# Patient Record
Sex: MALE | Race: WHITE | Employment: FULL TIME | URBAN - METROPOLITAN AREA
[De-identification: names, ages, dates, MRNs, and addresses within clinical notes are randomized per-mention and may not be internally consistent; named-entity substitution may affect disease eponyms.]

---

## 2014-03-17 LAB — HIV AG/AB: NONREACTIVE

## 2017-10-03 ENCOUNTER — HOSPITAL ENCOUNTER (OUTPATIENT)
Dept: SURGERY | Age: 37
Discharge: HOME | End: 2017-10-03
Attending: ORTHOPAEDIC SURGERY | Admitting: ORTHOPAEDIC SURGERY

## 2017-10-03 ENCOUNTER — OFFICE VISIT (OUTPATIENT)
Dept: ORTHOPEDIC SURGERY | Age: 37
End: 2017-10-03

## 2017-10-03 VITALS
BODY MASS INDEX: 21.91 KG/M2 | HEART RATE: 76 BPM | HEIGHT: 69 IN | DIASTOLIC BLOOD PRESSURE: 76 MMHG | RESPIRATION RATE: 18 BRPM | OXYGEN SATURATION: 100 % | SYSTOLIC BLOOD PRESSURE: 124 MMHG | WEIGHT: 147.9 LBS | TEMPERATURE: 97.2 F

## 2017-10-03 VITALS
DIASTOLIC BLOOD PRESSURE: 88 MMHG | HEIGHT: 70 IN | SYSTOLIC BLOOD PRESSURE: 148 MMHG | HEART RATE: 91 BPM | RESPIRATION RATE: 15 BRPM | WEIGHT: 145 LBS | BODY MASS INDEX: 20.76 KG/M2

## 2017-10-03 DIAGNOSIS — S82.61XA CLOSED DISPLACED FRACTURE OF LATERAL MALLEOLUS OF RIGHT FIBULA, INITIAL ENCOUNTER: Primary | ICD-10-CM

## 2017-10-03 DIAGNOSIS — T14.8XXA FRACTURE: ICD-10-CM

## 2017-10-03 PROCEDURE — 27792 TREATMENT OF ANKLE FRACTURE: CPT | Performed by: ORTHOPAEDIC SURGERY

## 2017-10-03 PROCEDURE — 99203 OFFICE O/P NEW LOW 30 MIN: CPT | Performed by: ORTHOPAEDIC SURGERY

## 2017-10-03 RX ORDER — CEPHALEXIN 500 MG/1
500 CAPSULE ORAL 4 TIMES DAILY
Qty: 20 CAPSULE | Refills: 0 | Status: SHIPPED | OUTPATIENT
Start: 2017-10-03 | End: 2018-06-05

## 2017-10-03 RX ORDER — LIDOCAINE HYDROCHLORIDE 10 MG/ML
1 INJECTION, SOLUTION EPIDURAL; INFILTRATION; INTRACAUDAL; PERINEURAL
Status: COMPLETED | OUTPATIENT
Start: 2017-10-03 | End: 2017-10-03

## 2017-10-03 RX ORDER — SODIUM CHLORIDE 0.9 % (FLUSH) 0.9 %
10 SYRINGE (ML) INJECTION EVERY 12 HOURS SCHEDULED
Status: DISCONTINUED | OUTPATIENT
Start: 2017-10-03 | End: 2017-10-04 | Stop reason: HOSPADM

## 2017-10-03 RX ORDER — HYDROMORPHONE HCL 110MG/55ML
0.5 PATIENT CONTROLLED ANALGESIA SYRINGE INTRAVENOUS EVERY 5 MIN PRN
Status: DISCONTINUED | OUTPATIENT
Start: 2017-10-03 | End: 2017-10-04 | Stop reason: HOSPADM

## 2017-10-03 RX ORDER — ONDANSETRON 2 MG/ML
4 INJECTION INTRAMUSCULAR; INTRAVENOUS
Status: ACTIVE | OUTPATIENT
Start: 2017-10-03 | End: 2017-10-03

## 2017-10-03 RX ORDER — MEPERIDINE HYDROCHLORIDE 25 MG/ML
12.5 INJECTION INTRAMUSCULAR; INTRAVENOUS; SUBCUTANEOUS EVERY 5 MIN PRN
Status: DISCONTINUED | OUTPATIENT
Start: 2017-10-03 | End: 2017-10-04 | Stop reason: HOSPADM

## 2017-10-03 RX ORDER — SODIUM CHLORIDE, SODIUM LACTATE, POTASSIUM CHLORIDE, CALCIUM CHLORIDE 600; 310; 30; 20 MG/100ML; MG/100ML; MG/100ML; MG/100ML
INJECTION, SOLUTION INTRAVENOUS CONTINUOUS
Status: DISCONTINUED | OUTPATIENT
Start: 2017-10-03 | End: 2017-10-04 | Stop reason: HOSPADM

## 2017-10-03 RX ORDER — CEFAZOLIN SODIUM 2 G/100ML
2 INJECTION, SOLUTION INTRAVENOUS ONCE
Status: COMPLETED | OUTPATIENT
Start: 2017-10-03 | End: 2017-10-03

## 2017-10-03 RX ORDER — HYDRALAZINE HYDROCHLORIDE 20 MG/ML
5 INJECTION INTRAMUSCULAR; INTRAVENOUS EVERY 10 MIN PRN
Status: DISCONTINUED | OUTPATIENT
Start: 2017-10-03 | End: 2017-10-04 | Stop reason: HOSPADM

## 2017-10-03 RX ORDER — OXYCODONE HYDROCHLORIDE AND ACETAMINOPHEN 5; 325 MG/1; MG/1
1 TABLET ORAL
Status: ACTIVE | OUTPATIENT
Start: 2017-10-03 | End: 2017-10-03

## 2017-10-03 RX ORDER — LABETALOL HYDROCHLORIDE 5 MG/ML
5 INJECTION, SOLUTION INTRAVENOUS EVERY 10 MIN PRN
Status: DISCONTINUED | OUTPATIENT
Start: 2017-10-03 | End: 2017-10-04 | Stop reason: HOSPADM

## 2017-10-03 RX ORDER — SODIUM CHLORIDE 0.9 % (FLUSH) 0.9 %
10 SYRINGE (ML) INJECTION PRN
Status: DISCONTINUED | OUTPATIENT
Start: 2017-10-03 | End: 2017-10-04 | Stop reason: HOSPADM

## 2017-10-03 RX ORDER — MIDAZOLAM HYDROCHLORIDE 1 MG/ML
4 INJECTION INTRAMUSCULAR; INTRAVENOUS ONCE
Status: COMPLETED | OUTPATIENT
Start: 2017-10-03 | End: 2017-10-03

## 2017-10-03 RX ORDER — SODIUM CHLORIDE 9 MG/ML
INJECTION, SOLUTION INTRAVENOUS CONTINUOUS
Status: DISCONTINUED | OUTPATIENT
Start: 2017-10-03 | End: 2017-10-04 | Stop reason: HOSPADM

## 2017-10-03 RX ORDER — OXYCODONE HYDROCHLORIDE AND ACETAMINOPHEN 5; 325 MG/1; MG/1
1 TABLET ORAL EVERY 4 HOURS PRN
Qty: 42 TABLET | Refills: 0 | Status: SHIPPED | OUTPATIENT
Start: 2017-10-03 | End: 2018-06-05

## 2017-10-03 RX ADMIN — MIDAZOLAM HYDROCHLORIDE 4 MG: 1 INJECTION INTRAMUSCULAR; INTRAVENOUS at 11:24

## 2017-10-03 RX ADMIN — LIDOCAINE HYDROCHLORIDE 0.2 ML: 10 INJECTION, SOLUTION EPIDURAL; INFILTRATION; INTRACAUDAL; PERINEURAL at 11:14

## 2017-10-03 RX ADMIN — CEFAZOLIN SODIUM 2 G: 2 INJECTION, SOLUTION INTRAVENOUS at 11:56

## 2017-10-03 RX ADMIN — SODIUM CHLORIDE: 9 INJECTION, SOLUTION INTRAVENOUS at 11:14

## 2017-10-03 ASSESSMENT — PAIN - FUNCTIONAL ASSESSMENT: PAIN_FUNCTIONAL_ASSESSMENT: 0-10

## 2017-10-03 NOTE — IP AVS SNAPSHOT
Allergies as of 10/3/2017     No Known Allergies      Immunizations as of 10/3/2017     Name Date Dose VIS Date Route    Tdap (Boostrix, Adacel) 3/10/2012 0.5 mL 2008 Intramuscular      Last Vitals          Most Recent Value    Temp  97.2 °F (36.2 °C)    Pulse  76    Resp  18    BP  124/76         After Visit Summary    This summary was created for you. Thank you for entrusting your care to us. The following information includes details about your hospital/visit stay along with steps you should take to help with your recovery once you leave the hospital.  In this packet, you will find information about the topics listed below:    · Instructions about your medications including a list of your home medications  · A summary of your hospital visit  · Follow-up appointments once you have left the hospital  · Your care plan at home      You may receive a survey regarding the care you received during your stay. Your input is valuable to us. We encourage you to complete and return your survey in the envelope provided. We hope you will choose us in the future for your healthcare needs. Patient Information     Patient Name WILLIAM Harper 1980      Care Provided at:     Name Address Phone       [address] [phone]            Your Visit    Here you will find information about your visit, including the reason for your visit. Please take this sheet with you when you visit your doctor or other health care provider in the future. It will help determine the best possible medical care for you at that time. If you have any questions once you leave the hospital, please call the department phone number listed below. Why you were here     Your primary diagnosis was:  Not on File      Visit Information     Date & Time Provider Department Dept.  Phone    10/3/2017 Gracy Garcia MD [facility] [phone]       Follow-up Appointments Below is a list of your follow-up and future appointments. This may not be a complete list as you may have made appointments directly with providers that we are not aware of or your providers may have made some for you. Please call your providers to confirm appointments. It is important to keep your appointments. Please bring your current insurance card, photo ID, co-pay, and all medication bottles to your appointment. If self-pay, payment is expected at the time of service. Future Appointments     10/17/2017 9:15 AM     Appointment with Keshawn Grace MD at [address] [place] [address] ([phone])   [address] [place] [address] 200  [address] [place] [address]         Preventive Care        Date Due    HIV screening is recommended for all people regardless of risk factors  aged 15-65 years at least once (lifetime) who have never been HIV tested. 9/28/1997    Pneumococcal Vaccine - Pneumovax for adults aged 19-64 years with: chronic heart disease, chronic lung disease, diabetes mellitus, alcoholism, chronic liver disease, or cigarette smoking. (1 of 1 - PPSV23) 9/28/2001    Yearly Flu Vaccine (1) 9/1/2017    Tetanus Combination Vaccine (2 - Td) 3/10/2022                 Care Plan Once You Return Home    This section includes instructions you will need to follow once you leave the hospital.  Your care team will discuss these with you, so you and those caring for you know how to best care for your health needs at home. This section may also include educational information about certain health topics that may be of help to you. Discharge Instructions       ORTHOPEDIC DISCHARGE INSTRUCTIONS    · Follow your surgeons instructions. · Make follow-up appointment. · Observe operative area for signs of excessive bleeding such as a slow general ooze that saturates the dressing or bright red bleeding. In either case, apply pressure to the area and elevate if possible and call your surgeon right away. · Do not drive,operate machinery while taking narcotics. Important information for a smoker       SMOKING: QUIT SMOKING. THIS IS THE MOST IMPORTANT ACTION YOU CAN TAKE TO IMPROVE YOUR CURRENT AND FUTURE HEALTH. Call the [address] [place] [address] at [place] NOW ([phone])    Smoking harms nonsmokers. When nonsmokers are around people who smoke, they absorb nicotine, carbon monoxide, and other ingredients of tobacco smoke. DO NOT SMOKE AROUND CHILDREN     Children exposed to secondhand smoke are at an increased risk of:  Sudden Infant Death Syndrome (SIDS), acute respiratory infections, inflammation of the middle ear, and severe asthma. Over a longer time, it causes heart disease and lung cancer. There is no safe level of exposure to secondhand smoke. MyChart Signup     [employer] allows you to send messages to your doctor, view your test results, renew your prescriptions, schedule appointments, view visit notes, and more. How Do I Sign Up? 1. In your Internet browser, go to [email or web address]  2. Click on the Sign Up Now link in the Sign In box. You will see the New Member Sign Up page. 3. Enter your [employer] Access Code exactly as it appears below. You will not need to use this code after youve completed the sign-up process. If you do not sign up before the expiration date, you must request a new code. [employer] Access Code: [email or web address]  Expires: 11/30/2017 11:22 AM    4. Enter your Social Security Number ([id number]) and Date of Birth (mm/dd/yyyy) as indicated and click Submit. You will be taken to the next sign-up page. 5. Create a [email or web address] ID. This will be your [employer] login ID and cannot be changed, so think of one that is secure and easy to remember. 6. Create a [employer] password. You can change your password at any time. 7. Enter your Password Reset Question and Answer.  This can be used at a

## 2017-10-03 NOTE — H&P
[facility]   HISTORY AND PHYSICAL      I am seeing Jennifer Duarte at the request of Dr Reggie Rueda for evaluation of the patient's medical problems prior to open reduction internal fixation right lateral malleolus fracture     10/3/2017 8:10 AM    Patient Information:  Jennifer Daurte is a 30 y.o. male [id number]  PCP:  No primary care provider on file. (Tel: None )    Chief complaint:  I need my ankle fixed     History of Present Illness:  Gabrielle Hathaway is a 30 y.o. male who presented with a right ankle fracture. Symptom onset was acute for a time period of the past 2 - 3 days. The patient reports that he rolled his ankle while celebrating his birthday in his backyard. The severity is described as moderate to severe. The course of his symptoms over time is acute. The symptoms improved with pain medications and worsened with movement. The patient's symptom is associated with an ankle fracture. History obtained from patient and record review   No problems with previous anesthesia    REVIEW OF SYSTEMS:   Constitutional:  Negative for fever,chills or night sweats  ENT:  Negative for rhinorrhea, epistaxis, hoarseness, sore throat. Respiratory:   Negative for shortness of breath,wheezing  Cardiovascular:   Negative for  chest pain, palpitations   Gastrointestinal:  Negative for nausea, vomiting, diarrhea  Genitourinary:  Negative for polyuria, dysuria   Hematologic/Lymphatic:  Negative for  bleeding tendency, easy bruising  Musculoskeletal:  Negative for myalgias and arthralgias  Neurologic:  Negative for  confusion,dysarthria. Skin:  Negative for itching,rash  Psychiatric:  Negative for depression,anxiety, agitation. Endocrine:  Negative for polydipsia,polyuria,heat /cold intolerance. Past Medical History:   has no past medical history on file.      Past Surgical History:   has a past surgical history that includes above.    Problem List  Right ankle fracture    Tobacco abuse    Assessment & Recommendation:     1. Tobacco abuse:  Cessation encouraged  2. Right ankle fracture: For surgical repair today      Patient is  medically optimized for surgery. Thank you for the opportunity to participate in the care of your patient.   Love Snowden CNP    10/3/2017 8:10 AM

## 2017-10-03 NOTE — PROCEDURES
Ultrasound-guided Sciatic Nerve Block, Single Shot, Procedure Note    Kostas Porfirio    Procedure: Right Popliteal Sciatic Nerve Block, Single Shot, Ultrasound-guided  Indications: Postoperative pain control and as requested by surgeon  Consent: Risks/Benefits including infection and nerve injury discussed and         patient wishes to proceed  Timeout completed with RN  Patient Position: Prone  Sedation: Versed 4 mg         Fentanyl 0 ml  Skin Prep: Chlorhexadine  Procedure Note:  Patient identified, consented, site marked, time out performed. Monitors in place. Patient positioned prone. Right popliteal fossa prepped. Sciatic nerve above level of split identified on ultrasound along with popliteal vessels. 1% lidocaine used to anesthetize the skin and subcutaneous tissues. 4 in. 21 ga. short bevel Stimuplex needle inserted under ultrasound guidance from lateral aspect in plane with tip in view throughout to the sciatic nerve. In divided doses and with negative aspiration throughout, 20 cc of 0.5% bupivacaine plain was slowly injected around the nerve. No parasthesias noted and good local spread seen on ultrasound. Patient tolerated the procedure well without any apparent or immediate complications. Ultrasound images placed in patient's chart.     Diana Gagnon  11:41 AM

## 2017-10-03 NOTE — PROGRESS NOTES
Right lower extremity nerve block complete, patient tolerated procedure well. O2 sat remains 100% on O2 at 2 liters per nasal canula. Continuous pulse ox remains in place.

## 2017-10-03 NOTE — PROGRESS NOTES
CHIEF COMPLAINT: Right ankle pain / lateral malleolus fracture. DATE OF INJURY: 10/1/2017    HISTORY:  Mr. Twila Mora 30 y.o.  male presents today for the first visit for evaluation of a right ankle injury which occurred when he was lifting someone up and twisted his right ankle when he was celebrating his birthday. He was first seen and evaluated in  , where he was x-rayed, splinted and asked to f/u with me. He is complaining of medial & lateral ankle pain and swelling. This is better with elevation and worse with bearing any wt. The pain is sharp and not radiating. No other complaint. No past medical history on file. Past Surgical History:   Procedure Laterality Date    BACK SURGERY      SHOULDER SURGERY Right        Social History     Social History    Marital status:      Spouse name: N/A    Number of children: N/A    Years of education: N/A     Occupational History    Not on file. Social History Main Topics    Smoking status: Current Every Day Smoker     Packs/day: 1.00     Types: Cigarettes    Smokeless tobacco: Not on file    Alcohol use Yes      Comment: occassionally    Drug use: Yes     Special: Marijuana      Comment: \"once in a while\"    Sexual activity: Yes     Other Topics Concern    Not on file     Social History Narrative       No family history on file. Current Outpatient Prescriptions on File Prior to Visit   Medication Sig Dispense Refill    HYDROcodone-acetaminophen (NORCO) 5-325 MG per tablet Take 1 tablet by mouth every 6 hours as needed for Pain . 10 tablet 0    ibuprofen (ADVIL;MOTRIN) 800 MG tablet Take 1 tablet by mouth every 8 hours as needed for Pain or Fever 20 tablet 0     No current facility-administered medications on file prior to visit. Pertinent items are noted in HPI  Review of systems reviewed from Patient History Form dated on 10/3/2017 and available in the patient's chart under the Media tab.         PHYSICAL

## 2017-10-03 NOTE — PROGRESS NOTES
Patient safety time out performed for right lower extremity nerve block, 4 mg versed administered by Dr Freeman Man for procedure, O2 sat 100% on room air, O2 on at 2 liters per nasal canula. Continuous pulse ox in place.

## 2017-10-03 NOTE — PROGRESS NOTES
Patient arrived from OR to PACU. Oxygen saturation 100% on 2L n/c. NSR on the monitor. VSS. drsg to right ankle CDI. Skin warm, brisk cap refill, + 2 pedal pulse.  Will continue to monitor    Electronically signed by Qasim Teague RN on 10/3/2017 at [phone]

## 2017-10-03 NOTE — PROGRESS NOTES
Discharge instructions went over with patient and patient's girlfriend. Patient has prescriptions. Patient has own crutches. Aware to take only one narcotic to help with pain control patient has a prescription for norco which was given prior to procedure and prescription for percocet for procedure), patient verbalizes understanding. All personal belongings with patient.  Girlfriend taking patient home in stable condition    Electronically signed by Qasim Teague RN on 10/3/2017 at 1440

## 2017-10-03 NOTE — BRIEF OP NOTE
Brief Postoperative Note    Leeanne Marshall  YOB: 1980  [id number]    Pre-operative Diagnosis: Right ankle lateral malleolus fracture.     Post-operative Diagnosis: Same    Procedure: ORIF right ankle lateral malleolus fracture    Anesthesia: General    Surgeons/Assistants: Izabel/Nimo    Estimated Blood Loss: less than 50     Complications: None    Specimens: Was Not Obtained    Findings: Same    Electronically signed by Tawanna Coon MD on 10/3/2017 at 2:16 PM

## 2017-10-03 NOTE — ANESTHESIA PRE-OP
capsule Take 1 capsule by mouth 4 times daily 20 capsule 0     Current Facility-Administered Medications   Medication Dose Route Frequency Provider Last Rate Last Dose    ceFAZolin (ANCEF) 2 g in dextrose 4 % 100 mL IVPB (premix)  2 g Intravenous Once Rula Joseph MD        HYDROmorphone (DILAUDID) injection 0.5 mg  0.5 mg Intravenous Q5 Min PRN Lul Reynoso MD        oxyCODONE-acetaminophen (PERCOCET) 5-325 MG per tablet 1 tablet  1 tablet Oral Once PRN Lul Reynoso MD        ondansetron [facility]) injection 4 mg  4 mg Intravenous Once PRN Lul Reynoso MD        labetalol (NORMODYNE;TRANDATE) injection 5 mg  5 mg Intravenous Q10 Min PRN Lul Reynoso MD        hydrALAZINE (APRESOLINE) injection 5 mg  5 mg Intravenous Q10 Min PRN Lul Reynoso MD        meperidine (DEMEROL) injection 12.5 mg  12.5 mg Intravenous Q5 Min PRN Lul Reynoso MD        midazolam (VERSED) injection 4 mg  4 mg Intravenous Once Lul Reynoso MD           Vital Signs  (Current)   Vitals:    10/03/17 1051   BP: 127/86   Pulse: 83   Resp: 14   Temp: 98.7 °F (37.1 °C)   SpO2: 100%     (for past 48 hrs)  BP  Min: 127/86   Min taken time: 10/03/17 1051  Max: 148/88   Max taken time: 10/03/17 100  Temp  Av.7 °F (37.1 °C)  Min: 98.7 °F (37.1 °C)   Min taken time: 10/03/17 105  Max: 98.7 °F (37.1 °C)   Max taken time: 10/03/17 105  Pulse  Av.3  Min: 83   Min taken time: 10/03/17 105  Max: 91   Max taken time: 10/03/17 1001  Resp  Av.5  Min: 15   Min taken time: 10/03/17 105  Max: 15   Max taken time: 10/03/17 0941  SpO2  Av %  Min: 100 %   Min taken time: 10/03/17 105  Max: 100 %   Max taken time: 10/03/17 105  (last three values)   BP Readings from Last 3 Encounters:   10/03/17 (!) 148/88   10/03/17 127/86   10/01/17 134/86       CBC  No results found for: WBC, RBC, HGB, HCT, MCV, RDW, PLT    CMP  No results found for: NA, K, CL, CO2, BUN, CREATININE, GFRAA, AGRATIO, LABGLOM, GLUCOSE, PROT, CALCIUM, BILITOT, ALKPHOS, AST, ALT    BMP  No results found for: NA, K, CL, CO2, BUN, CREATININE, CALCIUM, GFRAA, LABGLOM, GLUCOSE    Coags   No results found for: PROTIME, INR, APTT    HCG (If Applicable) No results found for: PREGTESTUR, PREGSERUM, HCG, HCGQUANT     ABGs  No results found for: PHART, PO2ART, MJU1JOF, BLX3NRM, BEART, F3FNKXNN     Type & Screen (If Applicable)  No results found for: LABABO, LABRH      POCGlucose  No results for input(s): GLUCOSE in the last 72 hours. NPO Status  > 8 hours   Date of last liquid consumption: 10/03/17   Time of last liquid consumption: 0900   Date of last solid food consumption: 10/02/17      Time of last solid consumption: 2030    BMI  Body mass index is 21.84 kg/(m^2). Estimated body mass index is 21.84 kg/(m^2) as calculated from the following:    Height as of this encounter: 5' 9\" (1.753 m). Weight as of this encounter: 147 lb 14.4 oz (67.1 kg). Additional Testing (Echo, Stress, ECG, PFTs, etc)        Anesthesia Evaluation  Patient summary reviewed and Nursing notes reviewed no history of anesthetic complications:   Airway: Mallampati: II  TM distance: >3 FB   Neck ROM: full  Mouth opening: > = 3 FB Dental:    (+) lower dentures and upper dentures      Pulmonary:normal exam  breath sounds clear to auscultation      (-) asthma    ROS comment: Current smoker   Cardiovascular:negative ROS  Exercise tolerance: good (>4 METS),           Rhythm: regular  Rate: normal                 Neuro/Psych:   {neg ROS     GI/Hepatic/Renal: neg ROS       (-) GERD     Endo/Other: negative ROS         Abdominal:                    Anesthesia Plan    ASA 2     general   (Consented for sciatic block, risks/benefits discussed including nerve damage, bleeding, infection, and local anesthetic toxicity, patient understanding and wishes to proceed)  intravenous induction   Anesthetic plan and risks discussed with patient.     Plan discussed

## 2017-10-04 NOTE — OP NOTE
[facility] [place] [facility]                    [facility], [address] [place] [address]                               OPERATIVE REPORT    PATIENT NAME: Sumit Mejia                   :             1980  MED REC NO:   [id number]                           ROOM:  ACCOUNT NO:   [de-identified]                           ADMISSION DATE:  10/03/2017  PROVIDER:     Avani Herring MD      DATE OF PROCEDURE:  10/03/2017    PREOPERATIVE DIAGNOSIS:  Right ankle lateral malleolus displaced  fracture. POSTOPERATIVE DIAGNOSIS:  Right ankle lateral malleolus displaced  fracture. OPERATION PERFORMED:  1. Open treatment of right ankle lateral malleolus fracture. 2.  Internal fixation. SURGEON:  MD Esteban Luciano, surgical assistant. ANESTHESIA:  General anesthesia. ESTIMATED BLOOD LOSS:  Minimal.    COMPLICATIONS:  None. TOURNIQUET:  Right upper calf 250 mmHg. IMPLANTS USED:  Zhang distal fibular locking plate and 1 lag screw. INDICATIONS:  This is a 53-year-old white male who sustained a  twisting injury to his right ankle with a displaced lateral malleolus  fracture. All the risks, benefits and alternatives were discussed  with the patient. He elected to proceed with surgical  treatment. DESCRIPTION OF THE PROCEDURE:  The patient's right ankle was marked. He received 2 g Ancef IV preoperatively. The patient was then brought  to the operating room, underwent general anesthesia. A well-padded  tourniquet was placed to the right upper calf. The right lower  extremity was then prepped and draped in regular sterile routine  fashion. A time-out was called to confirm the patient's name, site,  and procedure. Esmarch was used for exsanguination. Tourniquet was inflated to 250  mmHg. A lateral incision was made over the distal fibula. The  fracture was exposed and was found to be mildly displaced with  shortening and mild irritation.   We were able to clean up the fracture  fragments and reduce it anatomically with two bone clamps. While  maintaining the reduction we placed one lag screw from the anterior to  posterior. At this point we put the plate in its appropriate  position. After we confirmed that the plate was in good position and  the fracture anatomically reduced, We put one screw in the oblong hole  and then we locked it in place with total of five distal 2.7 locking  screws. We added one more cortical screw in the shaft. Total three  screws proximally and five screws distally. We are very satisfied  with the anatomic reduction and stabilization of the fracture. At  this point, we let the tourniquet down and hemostasis was secured. We  irrigated the incision copiously with normal saline. We then closed  the deep layer with a 2-0 Vicryl, subcutaneous with a 3-0 Vicryl, and  the skin with a 4-0 Monocryl. Steri-Strips were then applied. A  dressing was then applied in the form of Xeroform, 4x4s, sterile  Webril, and a posterior splint was applied. The patient tolerated the procedure well and was taken to the recovery  in stable condition. POSTOPERATIVE PLAN:  The patient will be discharged homer. He will be  nonweightbearing for at least six weeks. We can start range of motion  in two weeks.         Radha Nunez MD    D: 10/03/2017 16:19:25       T: 10/03/2017 22:43:49     [email or web address]  Job#: [id number]     Doc#: [id number]

## 2017-10-04 NOTE — ANESTHESIA POST-OP
Anesthesia Post-op Note    Patient: Eric Ross  MRN: [id number]  YOB: 1980  Date of evaluation: 10/4/2017  Time:  6:35 AM     Procedure(s) Performed:     Last Vitals: /76  Pulse 76  Temp 97.2 °F (36.2 °C) (Temporal)   Resp 18  Ht 5' 9\" (1.753 m)  Wt 147 lb 14.4 oz (67.1 kg)  SpO2 100%  BMI 21.84 kg/m2    Mercy Phase I: Mercy Score: 10    Mercy Phase II: Mercy Score: 10    Anesthesia Post Evaluation    Final anesthesia type: general  Patient location during evaluation: PACU  Patient participation: complete - patient participated  Level of consciousness: awake and alert  Airway patency: patent  Nausea & Vomiting: no nausea and no vomiting  Complications: no  Cardiovascular status: hemodynamically stable  Respiratory status: acceptable  Hydration status: stable        Fatuma Fry MD  6:35 AM

## 2017-10-11 RX ORDER — OXYCODONE HYDROCHLORIDE AND ACETAMINOPHEN 5; 325 MG/1; MG/1
1 TABLET ORAL EVERY 4 HOURS PRN
Qty: 42 TABLET | Refills: 0 | Status: SHIPPED | OUTPATIENT
Start: 2017-10-11 | End: 2018-06-05

## 2017-10-11 NOTE — TELEPHONE ENCOUNTER
Pt requesting a refill of Oxycodone and antibiotic  pls call pt when sent in to his pharmacy   pls call pt thanks

## 2017-10-17 ENCOUNTER — OFFICE VISIT (OUTPATIENT)
Dept: ORTHOPEDIC SURGERY | Age: 37
End: 2017-10-17

## 2017-10-17 VITALS — HEIGHT: 69 IN | WEIGHT: 147 LBS | RESPIRATION RATE: 15 BRPM | BODY MASS INDEX: 21.77 KG/M2

## 2017-10-17 DIAGNOSIS — S82.61XA CLOSED DISPLACED FRACTURE OF LATERAL MALLEOLUS OF RIGHT FIBULA, INITIAL ENCOUNTER: Primary | ICD-10-CM

## 2017-10-17 PROCEDURE — 99024 POSTOP FOLLOW-UP VISIT: CPT | Performed by: NURSE PRACTITIONER

## 2017-10-17 PROCEDURE — L4361 PNEUMA/VAC WALK BOOT PRE OTS: HCPCS | Performed by: NURSE PRACTITIONER

## 2017-10-17 PROCEDURE — 73610 X-RAY EXAM OF ANKLE: CPT | Performed by: NURSE PRACTITIONER

## 2017-10-17 NOTE — PROGRESS NOTES
DIAGNOSIS:  Right ankle lateral malleolus displaced fracture, status post ORIF. DATE OF SURGERY:  10/3/2017. HISTORY OF PRESENT ILLNESS:  Mr. Mini Chi 30 y.o.  male who came in today for 2 weeks postoperative visit. The patient denies any significant pain in the right ankle. Rates pain a 2/10 VAS mild, aching, intermittent and are improving. Pain is worse with movement and activity and better with rest. Alleviating factors ice, elevation and rest. He has been in a splint, and non WB. No numbness or tingling sensation. No fever or Chills. PHYSICAL EXAMINATION:  The incision healing well. No signs of any erythema or drainage, minimal swelling. He has no pain with the active or passive range of motion of the right ankle, but decrease ROM. He has intact sensation distally, and he is neurovascularly intact. IMAGING:  Three views right ankle showed anatomic alignment of the fracture, plate and screws in good position, no loosening. Ankle mortise is well centered. IMPRESSION:  2 weeks out from right ankle lateral malleolus displaced fracture, ORIF and doing very well. PLAN: He placed in a boot, and non WB for 6 weeks. I have told the patient to work on ROM, The patient will come back for a follow up in 6 weeks. At that time, we will take 3 views of the right ankle standing. The patient smokes, and we discussed with the patient the risks of smoking on general health and also on bone and soft tissue healing (delay and non-union), and promised to cut down or stop smoking. Procedures    Breg Tall Genisus Walking Boot     Patient was prescribed a Breg Tall Genisus Walking Boot. The right ankle will require stabilization / immobilization from this semi-rigid / rigid orthosis to improve their function. The orthosis will assist in protecting the affected area, provide functional support and facilitate healing.     The patient was educated and fit by a healthcare professional with expert knowledge and specialization in brace application while under the direct supervision of the physician. Verbal and written instructions for the use of and application of this item were provided. They were instructed to contact the office immediately should the brace result in increased pain, decreased sensation, increased swelling or worsening of the condition.        Lin January, CNP

## 2017-10-23 DIAGNOSIS — S82.61XA CLOSED DISPLACED FRACTURE OF LATERAL MALLEOLUS OF RIGHT FIBULA, INITIAL ENCOUNTER: Primary | ICD-10-CM

## 2017-10-23 RX ORDER — OXYCODONE HYDROCHLORIDE AND ACETAMINOPHEN 5; 325 MG/1; MG/1
1 TABLET ORAL EVERY 4 HOURS PRN
Qty: 42 TABLET | Refills: 0 | Status: SHIPPED | OUTPATIENT
Start: 2017-10-23 | End: 2017-11-22

## 2017-10-23 NOTE — TELEPHONE ENCOUNTER
Patient requesting a refill of Ibuprofen 800 mg, last filled 10/1/17 and Percocet 5/325 mg, last filled 10/11/17    Preferred pharmacy -[facility] [phone]    Patient can be reached at [phone]

## 2017-11-06 DIAGNOSIS — S82.61XA CLOSED DISPLACED FRACTURE OF LATERAL MALLEOLUS OF RIGHT FIBULA, INITIAL ENCOUNTER: Primary | ICD-10-CM

## 2017-11-06 RX ORDER — OXYCODONE HYDROCHLORIDE AND ACETAMINOPHEN 5; 325 MG/1; MG/1
1 TABLET ORAL EVERY 6 HOURS PRN
Qty: 30 TABLET | Refills: 0 | Status: SHIPPED | OUTPATIENT
Start: 2017-11-06 | End: 2017-12-06

## 2017-11-14 RX ORDER — HYDROCODONE BITARTRATE AND ACETAMINOPHEN 5; 325 MG/1; MG/1
1 TABLET ORAL EVERY 6 HOURS PRN
Qty: 28 TABLET | Refills: 0 | Status: SHIPPED | OUTPATIENT
Start: 2017-11-14 | End: 2017-11-21

## 2017-11-14 NOTE — TELEPHONE ENCOUNTER
Pt called to get a refill on his pain medicine  Last filled 11-6-17  Amount 30   [facility] [phone]

## 2017-11-21 RX ORDER — HYDROCODONE BITARTRATE AND ACETAMINOPHEN 5; 325 MG/1; MG/1
1 TABLET ORAL EVERY 8 HOURS PRN
Qty: 21 TABLET | Refills: 0 | Status: SHIPPED | OUTPATIENT
Start: 2017-11-21 | End: 2017-11-28

## 2017-11-28 ENCOUNTER — OFFICE VISIT (OUTPATIENT)
Dept: ORTHOPEDIC SURGERY | Age: 37
End: 2017-11-28

## 2017-11-28 VITALS — HEIGHT: 69 IN | WEIGHT: 147 LBS | RESPIRATION RATE: 16 BRPM | HEART RATE: 80 BPM | BODY MASS INDEX: 21.77 KG/M2

## 2017-11-28 DIAGNOSIS — S82.61XA CLOSED DISPLACED FRACTURE OF LATERAL MALLEOLUS OF RIGHT FIBULA, INITIAL ENCOUNTER: Primary | ICD-10-CM

## 2017-11-28 PROCEDURE — 73610 X-RAY EXAM OF ANKLE: CPT | Performed by: NURSE PRACTITIONER

## 2017-11-28 PROCEDURE — 99024 POSTOP FOLLOW-UP VISIT: CPT | Performed by: NURSE PRACTITIONER

## 2017-11-28 RX ORDER — HYDROCODONE BITARTRATE AND ACETAMINOPHEN 5; 325 MG/1; MG/1
1 TABLET ORAL 2 TIMES DAILY PRN
Qty: 14 TABLET | Refills: 0 | Status: SHIPPED | OUTPATIENT
Start: 2017-11-28 | End: 2018-06-05

## 2019-05-28 ENCOUNTER — HOSPITAL ENCOUNTER (EMERGENCY)
Age: 39
Discharge: HOME | End: 2019-05-28

## 2019-05-28 VITALS
HEART RATE: 99 BPM | OXYGEN SATURATION: 100 % | HEIGHT: 70 IN | BODY MASS INDEX: 23.62 KG/M2 | DIASTOLIC BLOOD PRESSURE: 89 MMHG | WEIGHT: 165 LBS | RESPIRATION RATE: 14 BRPM | SYSTOLIC BLOOD PRESSURE: 139 MMHG | TEMPERATURE: 98 F

## 2019-05-28 DIAGNOSIS — Z51.89 VISIT FOR WOUND CHECK: Primary | ICD-10-CM

## 2019-05-28 PROCEDURE — 99282 EMERGENCY DEPT VISIT SF MDM: CPT

## 2019-05-28 ASSESSMENT — ENCOUNTER SYMPTOMS
VOMITING: 0
COLOR CHANGE: 0
NAUSEA: 0
ABDOMINAL PAIN: 0
SHORTNESS OF BREATH: 0

## 2019-05-28 ASSESSMENT — PAIN SCALES - GENERAL: PAINLEVEL_OUTOF10: 3

## 2019-05-28 ASSESSMENT — PAIN DESCRIPTION - LOCATION: LOCATION: HEAD

## 2019-05-28 NOTE — ED NOTES
Patient discharged to home in stable condition with family via private car. Discharge instructions reviewed with patient and family members. Patient and family verbalized understanding. All belongings in tow including discharge paperwork.                    Keysha Shields RN  05/28/19 6779

## 2019-05-28 NOTE — ED PROVIDER NOTES
Laterality Date    ANKLE SURGERY Right 10/03/2017    BACK SURGERY      SHOULDER SURGERY Right          CURRENTMEDICATIONS       Previous Medications    No medications on file         ALLERGIES     Patient has no known allergies. FAMILYHISTORY     History reviewed. No pertinent family history.        SOCIAL HISTORY       Social History     Socioeconomic History    Marital status:      Spouse name: None    Number of children: None    Years of education: None    Highest education level: None   Occupational History    None   Social Needs    Financial resource strain: None    Food insecurity:     Worry: None     Inability: None    Transportation needs:     Medical: None     Non-medical: None   Tobacco Use    Smoking status: Current Every Day Smoker     Packs/day: 1.00     Types: Cigarettes    Smokeless tobacco: Never Used   Substance and Sexual Activity    Alcohol use: Yes     Comment: occassionally    Drug use: Yes     Types: Marijuana     Comment: last used Saturday    Sexual activity: Yes   Lifestyle    Physical activity:     Days per week: None     Minutes per session: None    Stress: None   Relationships    Social connections:     Talks on phone: None     Gets together: None     Attends Yazidism service: None     Active member of club or organization: None     Attends meetings of clubs or organizations: None     Relationship status: None    Intimate partner violence:     Fear of current or ex partner: None     Emotionally abused: None     Physically abused: None     Forced sexual activity: None   Other Topics Concern    None   Social History Narrative    None       SCREENINGS             PHYSICAL EXAM    (up to 7 for level 4, 8 or more for level 5)     ED Triage Vitals   BP Temp Temp Source Pulse Resp SpO2 Height Weight   05/28/19 1502 05/28/19 1500 05/28/19 1500 05/28/19 1500 05/28/19 1502 05/28/19 1500 05/28/19 1500 05/28/19 1500   139/89 98 °F (36.7 °C) Infrared 99 14 100 % 5' 10\" (1.778 m) 165 lb (74.8 kg)       Physical Exam   Constitutional: He is oriented to person, place, and time. He appears well-developed and well-nourished. No distress. HENT:   Head: Normocephalic and atraumatic. Nose: Nose normal.   Mouth/Throat: Oropharynx is clear and moist.   Patient has a 1.5 cm well healing laceration to the right crown scalp. Patient has a firm skin in place. Eyes: Conjunctivae and EOM are normal. Right eye exhibits no discharge. Left eye exhibits no discharge. Neck: Normal range of motion. Neck supple. Cardiovascular: Normal rate, regular rhythm and normal heart sounds. Exam reveals no gallop. No murmur heard. Pulmonary/Chest: Effort normal and breath sounds normal. No respiratory distress. He has no wheezes. He has no rales. Abdominal: Soft. There is no tenderness. Musculoskeletal: Normal range of motion. He exhibits no edema, tenderness or deformity. Neurological: He is alert and oriented to person, place, and time. Skin: Skin is warm and dry. Capillary refill takes less than 2 seconds. No rash noted. He is not diaphoretic. No erythema. No pallor. Psychiatric: He has a normal mood and affect. His behavior is normal.   Nursing note and vitals reviewed. DIAGNOSTIC RESULTS   LABS:    Labs Reviewed - No data to display    All other labs were within normal range or not returned as of this dictation. EKG: All EKG's are interpreted by the Emergency Department Physician who either signs orCo-signs this chart in the absence of a cardiologist.  Please see their note for interpretation of EKG. RADIOLOGY:   Non-plain film images such as CT, Ultrasound and MRI are read by the radiologist. Plain radiographic images are visualized andpreliminarily interpreted by the  ED Provider with the below findings:        Interpretation perthe Radiologist below, if available at the time of this note:    No orders to display     No results found.        PROCEDURES   Unless otherwise noted below, none     Procedures    CRITICAL CARE TIME   N/A    CONSULTS:  None      EMERGENCY DEPARTMENT COURSE and DIFFERENTIALDIAGNOSIS/MDM:   Vitals:    Vitals:    05/28/19 1500 05/28/19 1502   BP:  139/89   Pulse: 99    Resp:  14   Temp: 98 °F (36.7 °C)    TempSrc: Infrared    SpO2: 100%    Weight: 165 lb (74.8 kg)    Height: 5' 10\" (1.778 m)        Patient was given thefollowing medications:  Medications - No data to display    Patient presents emergency department for evaluation of scalp wound. Patient lacerated his scalp with a dog leash last night. Patient is up-to-date on tetanus. Patient has 1.5 centimeter well-healing laceration to the crown scalp. There is a firm scalp in place. At this time wound has healed and repair will not be performed at this time. Patient is amenable to this and will be discharged home. This time the patient is at low risk for infection, bleeding, other complication    FINAL IMPRESSION      1.  Visit for wound check          DISPOSITION/PLAN   DISPOSITION Decision To Discharge 05/28/2019 03:32:59 PM      PATIENT REFERREDTO:  [facility] Emergency Department  [address] [facility]  [phone]    If symptoms worsen      DISCHARGE MEDICATIONS:  New Prescriptions    No medications on file       DISCONTINUED MEDICATIONS:  Discontinued Medications    NAPROXEN (NAPROSYN) 500 MG TABLET    Take 1 tablet by mouth 2 times daily as needed for Pain              (Please note that portions ofthis note were completed with a voice recognition program.  Efforts were made to edit the dictations but occasionally words are mis-transcribed.)    Latisha Do PA-C (electronically signed)            Latisha Do PA-C  05/28/19 2605

## 2019-06-09 ENCOUNTER — HOSPITAL ENCOUNTER (EMERGENCY)
Age: 39
Discharge: HOME | End: 2019-06-09

## 2019-06-09 VITALS
RESPIRATION RATE: 18 BRPM | DIASTOLIC BLOOD PRESSURE: 82 MMHG | WEIGHT: 156.53 LBS | SYSTOLIC BLOOD PRESSURE: 129 MMHG | TEMPERATURE: 97.5 F | HEIGHT: 70 IN | OXYGEN SATURATION: 100 % | HEART RATE: 87 BPM | BODY MASS INDEX: 22.41 KG/M2

## 2019-06-09 DIAGNOSIS — S41.112A LACERATION OF MULTIPLE SITES OF LEFT UPPER EXTREMITY, INITIAL ENCOUNTER: Primary | ICD-10-CM

## 2019-06-09 PROCEDURE — 99282 EMERGENCY DEPT VISIT SF MDM: CPT

## 2019-06-09 PROCEDURE — 4500000022 HC ED LEVEL 2 PROCEDURE

## 2019-06-09 RX ORDER — CEPHALEXIN 500 MG/1
500 CAPSULE ORAL 4 TIMES DAILY
Qty: 40 CAPSULE | Refills: 0 | Status: SHIPPED | OUTPATIENT
Start: 2019-06-09 | End: 2019-06-19

## 2019-06-09 ASSESSMENT — PAIN DESCRIPTION - LOCATION: LOCATION: ELBOW

## 2019-06-09 ASSESSMENT — PAIN DESCRIPTION - PROGRESSION: CLINICAL_PROGRESSION: NOT CHANGED

## 2019-06-09 ASSESSMENT — PAIN DESCRIPTION - ORIENTATION: ORIENTATION: LEFT

## 2019-06-09 ASSESSMENT — PAIN DESCRIPTION - DESCRIPTORS: DESCRIPTORS: BURNING

## 2019-06-09 ASSESSMENT — PAIN SCALES - GENERAL: PAINLEVEL_OUTOF10: 1

## 2019-06-09 ASSESSMENT — PAIN - FUNCTIONAL ASSESSMENT: PAIN_FUNCTIONAL_ASSESSMENT: ACTIVITIES ARE NOT PREVENTED

## 2019-06-09 ASSESSMENT — PAIN DESCRIPTION - ONSET: ONSET: ON-GOING

## 2019-06-09 ASSESSMENT — PAIN DESCRIPTION - FREQUENCY: FREQUENCY: CONTINUOUS

## 2019-06-09 ASSESSMENT — PAIN DESCRIPTION - PAIN TYPE: TYPE: ACUTE PAIN

## 2019-06-09 NOTE — ED NOTES
Pt ambulates around er to BR and for water without complications.      Karlene Wisdom RN  06/09/19 0284

## 2019-06-09 NOTE — ED NOTES
Pt ambulating around unit stating \"just put a bandaid on it I'm fine. I'm gonna sign out against all medical advice. \" The PA then comes around corner to repair wounds.      Brittni Urena RN  06/09/19 8384

## 2019-06-09 NOTE — ED PROVIDER NOTES
oriented to person, place, and time. Vital signs are normal. He appears well-developed and well-nourished. Non-toxic appearance. No distress. HENT:   Head: Normocephalic and atraumatic. Eyes: Conjunctivae are normal. No scleral icterus. Neck: Full passive range of motion without pain. Neck supple. No JVD present. No spinous process tenderness and no muscular tenderness present. Cardiovascular: Normal rate and regular rhythm. Exam reveals no gallop and no friction rub. No murmur heard. Pulmonary/Chest: Effort normal and breath sounds normal. No respiratory distress. Musculoskeletal: Normal range of motion. Neurological: He is alert and oriented to person, place, and time. He has normal strength. No cranial nerve deficit or sensory deficit. Reflex Scores:       Brachioradialis reflexes are 2+ on the right side and 2+ on the left side. Skin: Skin is warm and dry. Capillary refill takes less than 2 seconds. Laceration noted. Patient has 3 lacerations on the left arm totaling 7 cm. He also has bruising and several other small abrasions/lacerations. Distal extremity is pink and well perfused. Left radial pulse 2+. Ulnar, medial, and radial nerves intact. Muscles are all soft with 5/5 strength. All 3 lacerations were anesthetized and then copiously irrigated vigorously scrubbed with chlorhexidine. Patient refused x-ray. I had to talk him into getting sutures. There was no bone tendon or foreign body in the wounds. A total of 11 sutures were placed with decent approximation of wound edges. Patient remains neurovascularly intact. Patient will be started on Keflex due to the extent and number of injuries. Psychiatric: He has a normal mood and affect. Nursing note and vitals reviewed.                       MEDICAL DECISION MAKING    Vitals:    Vitals:    06/09/19 0103   BP: 129/82   Pulse: 87   Resp: 18   Temp: 97.5 °F (36.4 °C)   TempSrc: Oral   SpO2: 100%   Weight: 156 lb 8.4 oz (71 kg) Height: 5' 10\" (1.778 m)       LABS:Labs Reviewed - No data to display     Remainder of labs reviewed and werenegative at this time or not returned at the time of this note. RADIOLOGY:   Non-plain film images such as CT, Ultrasound and MRI are read by the radiologist. HOMER Galvan CNP have directly visualized the radiologic plain film image(s) with the below findings:        Interpretation per the Radiologist below, if available at the time of thisnote:    No orders to display        No results found. MEDICAL DECISION MAKING / ED COURSE:      PROCEDURES:   Lac Repair  Date/Time: 6/9/2019 4:14 AM  Performed by: HOMER Hedrick CNP  Authorized by: HOMER Hedrick CNP     Consent:     Consent obtained:  Verbal    Consent given by:  Patient    Risks discussed:  Infection, poor cosmetic result, nerve damage, tendon damage, retained foreign body and vascular damage  Anesthesia (see MAR for exact dosages): Anesthesia method:  Local infiltration    Local anesthetic:  Bupivacaine 0.5% WITH epi  Laceration details:     Location:  Shoulder/arm    Shoulder/arm location:  L lower arm    Length (cm):  7  Repair type:     Repair type:  Simple  Pre-procedure details:     Preparation:  Patient was prepped and draped in usual sterile fashion and imaging obtained to evaluate for foreign bodies  Exploration:     Hemostasis achieved with:  Direct pressure and epinephrine    Wound exploration: wound explored through full range of motion and entire depth of wound probed and visualized      Contaminated: yes    Treatment:     Wound cleansed with: Chlorhexidine.     Amount of cleaning:  Extensive  Skin repair:     Repair method:  Sutures    Suture size:  5-0    Suture material:  Nylon    Suture technique:  Simple interrupted    Number of sutures:  11  Approximation:     Approximation:  Close    Vermilion border: well-aligned    Post-procedure details:     Dressing:  Sterile dressing    Patient tolerance of procedure: Tolerated well, no immediate complications        Patient was given:  Medications - No data to display    Differential diagnosis: Tendon laceration, neurologic injury, vascular injury, involvement of bone that could lead to osteomyelitis, retained foreign body, delayed bacterial skin infection, other    Patient seen and examined today for laceration. See HPI for patient presentation. Patient is hemodynamically stable, nontoxic, afebrile, and without tachycardia, tachypnea, and hypoxia. Physical exam as above. Well-appearing 68-year-old male lying in bed in no acute distress. Multiple lacerations. No bone tendon or foreign body. Neurovascularly intact without deficits. Wounds copiously irrigated vigorously scrubbed. 11 sutures placed. Keflex prescribed. Patient advised to have sutures out in 7 days and return immediately for any signs of infection. At this time, the evidence for any other entities in the differential is insufficient to justify any further testing. This was explained to the patient. The patient was advised that persistent or worsening symptoms will require further evaluation. The patient tolerated their visit well. I evaluated the patient. The physician was available for consultation as needed. The patient and / or the family were informed of the results of anytests, a time was given to answer questions, a plan was proposed and they agreed with plan. CLINICAL IMPRESSION:  1.  Laceration of multiple sites of left upper extremity, initial encounter        DISPOSITION Decision To Discharge 06/09/2019 03:39:40 AM      PATIENT REFERRED TO:  Edd Hameed  [phone]  Call       [facility] Emergency Department  [address] [address]  [phone]  Go to   As needed      DISCHARGE MEDICATIONS:  New Prescriptions    CEPHALEXIN (KEFLEX) 500 MG CAPSULE    Take 1 capsule by mouth 4 times daily for 10 days DISCONTINUED MEDICATIONS:  Discontinued Medications    No medications on file              (Please note the MDM and HPI sections of this note were completed with a voice recognition program.  Efforts weremade to edit the dictations but occasionally words are mis-transcribed.)    Electronically signed, HOMER Taylor CNP,           HOMER Taylor CNP  06/09/19 7965

## 2019-06-09 NOTE — ED NOTES
ER tech cleansed pt's wounds. Pt has 3 areas requiring suturing. All areas on posterior aspect of left upper arm all irregular shaped ranging in length from 1 to 4 cm. Bleeding controlled. Pt has multiple other superficial lacerations.       Wai Hines RN  06/09/19 0827

## 2020-02-27 ENCOUNTER — APPOINTMENT (OUTPATIENT)
Dept: CT IMAGING | Age: 40
End: 2020-02-27
Payer: COMMERCIAL

## 2020-02-27 ENCOUNTER — HOSPITAL ENCOUNTER (EMERGENCY)
Age: 40
Discharge: HOME | End: 2020-02-27
Attending: EMERGENCY MEDICINE
Payer: COMMERCIAL

## 2020-02-27 VITALS
HEIGHT: 70 IN | OXYGEN SATURATION: 100 % | DIASTOLIC BLOOD PRESSURE: 66 MMHG | SYSTOLIC BLOOD PRESSURE: 135 MMHG | HEART RATE: 87 BPM | BODY MASS INDEX: 22.9 KG/M2 | WEIGHT: 160 LBS | TEMPERATURE: 98 F | RESPIRATION RATE: 15 BRPM

## 2020-02-27 LAB
A/G RATIO: 1.2 (ref 1.1–2.2)
ALBUMIN SERPL-MCNC: 4.6 G/DL (ref 3.4–5)
ALP BLD-CCNC: 126 U/L (ref 40–129)
ALT SERPL-CCNC: 167 U/L (ref 10–40)
ANION GAP SERPL CALCULATED.3IONS-SCNC: 14 MMOL/L (ref 3–16)
AST SERPL-CCNC: 128 U/L (ref 15–37)
BASOPHILS ABSOLUTE: 0.1 K/UL (ref 0–0.2)
BASOPHILS RELATIVE PERCENT: 0.6 %
BILIRUB SERPL-MCNC: 0.5 MG/DL (ref 0–1)
BILIRUBIN URINE: NEGATIVE
BLOOD, URINE: NEGATIVE
BUN BLDV-MCNC: 10 MG/DL (ref 7–20)
CALCIUM SERPL-MCNC: 9.6 MG/DL (ref 8.3–10.6)
CHLORIDE BLD-SCNC: 98 MMOL/L (ref 99–110)
CLARITY: ABNORMAL
CO2: 23 MMOL/L (ref 21–32)
COLOR: YELLOW
CREAT SERPL-MCNC: 1 MG/DL (ref 0.9–1.3)
EOSINOPHILS ABSOLUTE: 0.3 K/UL (ref 0–0.6)
EOSINOPHILS RELATIVE PERCENT: 2 %
EPITHELIAL CELLS, UA: 0 /HPF (ref 0–5)
GFR AFRICAN AMERICAN: >60
GFR NON-AFRICAN AMERICAN: >60
GLOBULIN: 3.7 G/DL
GLUCOSE BLD-MCNC: 215 MG/DL (ref 70–99)
GLUCOSE URINE: NEGATIVE MG/DL
HCT VFR BLD CALC: 53.4 % (ref 40.5–52.5)
HEMOGLOBIN: 17.8 G/DL (ref 13.5–17.5)
HYALINE CASTS: 0 /LPF (ref 0–8)
KETONES, URINE: NEGATIVE MG/DL
LEUKOCYTE ESTERASE, URINE: ABNORMAL
LYMPHOCYTES ABSOLUTE: 2.2 K/UL (ref 1–5.1)
LYMPHOCYTES RELATIVE PERCENT: 15.8 %
MCH RBC QN AUTO: 35 PG (ref 26–34)
MCHC RBC AUTO-ENTMCNC: 33.4 G/DL (ref 31–36)
MCV RBC AUTO: 104.6 FL (ref 80–100)
MICROSCOPIC EXAMINATION: YES
MONOCYTES ABSOLUTE: 0.8 K/UL (ref 0–1.3)
MONOCYTES RELATIVE PERCENT: 6.2 %
NEUTROPHILS ABSOLUTE: 10.3 K/UL (ref 1.7–7.7)
NEUTROPHILS RELATIVE PERCENT: 75.4 %
NITRITE, URINE: NEGATIVE
PDW BLD-RTO: 13.8 % (ref 12.4–15.4)
PH UA: 6 (ref 5–8)
PLATELET # BLD: 164 K/UL (ref 135–450)
PLATELET SLIDE REVIEW: ADEQUATE
PMV BLD AUTO: 9.6 FL (ref 5–10.5)
POTASSIUM SERPL-SCNC: 4.8 MMOL/L (ref 3.5–5.1)
PROTEIN UA: NEGATIVE MG/DL
RBC # BLD: 5.1 M/UL (ref 4.2–5.9)
RBC UA: 0 /HPF (ref 0–4)
SLIDE REVIEW: ABNORMAL
SODIUM BLD-SCNC: 135 MMOL/L (ref 136–145)
SPECIFIC GRAVITY UA: 1.01 (ref 1–1.03)
TOTAL PROTEIN: 8.3 G/DL (ref 6.4–8.2)
URINE REFLEX TO CULTURE: YES
URINE TYPE: ABNORMAL
UROBILINOGEN, URINE: 0.2 E.U./DL
WBC # BLD: 13.7 K/UL (ref 4–11)
WBC UA: 11 /HPF (ref 0–5)

## 2020-02-27 PROCEDURE — 85025 COMPLETE CBC W/AUTO DIFF WBC: CPT

## 2020-02-27 PROCEDURE — 80053 COMPREHEN METABOLIC PANEL: CPT

## 2020-02-27 PROCEDURE — 74177 CT ABD & PELVIS W/CONTRAST: CPT

## 2020-02-27 PROCEDURE — 6360000004 HC RX CONTRAST MEDICATION: Performed by: PHYSICIAN ASSISTANT

## 2020-02-27 PROCEDURE — 99284 EMERGENCY DEPT VISIT MOD MDM: CPT

## 2020-02-27 PROCEDURE — 87086 URINE CULTURE/COLONY COUNT: CPT

## 2020-02-27 PROCEDURE — 81001 URINALYSIS AUTO W/SCOPE: CPT

## 2020-02-27 RX ORDER — NAPROXEN 500 MG/1
500 TABLET ORAL 2 TIMES DAILY
Qty: 20 TABLET | Refills: 0 | Status: SHIPPED | OUTPATIENT
Start: 2020-02-27 | End: 2020-03-03

## 2020-02-27 RX ADMIN — IOPAMIDOL 75 ML: 755 INJECTION, SOLUTION INTRAVENOUS at 15:05

## 2020-02-27 ASSESSMENT — ENCOUNTER SYMPTOMS
SHORTNESS OF BREATH: 0
VOMITING: 0
ABDOMINAL DISTENTION: 0
NAUSEA: 0
BACK PAIN: 0
ABDOMINAL PAIN: 1
CONSTIPATION: 0
COLOR CHANGE: 0
DIARRHEA: 0
COUGH: 0

## 2020-02-27 ASSESSMENT — PAIN DESCRIPTION - PAIN TYPE: TYPE: CHRONIC PAIN

## 2020-02-27 ASSESSMENT — PAIN SCALES - GENERAL: PAINLEVEL_OUTOF10: 5

## 2020-02-27 ASSESSMENT — PAIN DESCRIPTION - LOCATION: LOCATION: GROIN

## 2020-02-27 ASSESSMENT — PAIN DESCRIPTION - ORIENTATION: ORIENTATION: LEFT

## 2020-02-27 NOTE — ED PROVIDER NOTES
[address] ENCOUNTER        Pt Name: Janette Grajeda  MRN: [id number]  [place] 1980  Date of evaluation: 2/27/2020  Provider: Gianluca Wynne PA-C  PCP: No primary care provider on file. This patient was seen and evaluated by the attending physician [place] [address] [place] [address]       Chief Complaint   Patient presents with    Inguinal Hernia     pt states he has had hernia on left side but now getting worse and having burning sensation to left groin       HISTORY OF PRESENT ILLNESS   (Location, Timing/Onset, Context/Setting, Quality, Duration, Modifying Factors, Severity, Associated Signs and Symptoms)  Note limiting factors. Janette Grajeda is a 42 y.o. male who presents to the emergency department with known left inguinal hernia for several months. The patient states that the pain and swelling has gotten worse recently over the past few weeks. He rates his pain to be a 5 out of 10 on pain scale with some radiation into the left testicle. Has not noticed any swelling the left testicle. 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 Denies any worsening lesions. Denies penile discharge or discomfort with urination, hematuria, flank pain, fever or chills. He is sitting comfortably in bed playing a video game on his phone and does not appear to be in any acute distress. Nursing Notes were all reviewed and agreed with or any disagreements were addressed in the HPI. REVIEW OF SYSTEMS    (2-9 systems for level 4, 10 or more for level 5)     Review of Systems   Constitutional: Negative for chills and fever. Eyes: Negative for visual disturbance. Respiratory: Negative for cough and shortness of breath. Cardiovascular: Negative for chest pain. Gastrointestinal: Positive for abdominal pain. Negative for abdominal distention, constipation, diarrhea, nausea and vomiting. Endocrine: Negative.     Genitourinary: Positive for HENT:      Head: Normocephalic and atraumatic. Right Ear: External ear normal.      Left Ear: External ear normal.      Nose: Nose normal.      Mouth/Throat:      Mouth: Mucous membranes are moist.      Pharynx: Oropharynx is clear. Eyes:      General: No scleral icterus. Right eye: No discharge. Left eye: No discharge. Extraocular Movements: Extraocular movements intact. Conjunctiva/sclera: Conjunctivae normal.      Pupils: Pupils are equal, round, and reactive to light. Neck:      Musculoskeletal: Normal range of motion. Cardiovascular:      Rate and Rhythm: Normal rate. Pulmonary:      Effort: Pulmonary effort is normal.      Breath sounds: Normal breath sounds. Abdominal:      General: Bowel sounds are normal. There is no abdominal bruit. Palpations: Abdomen is soft. There is no pulsatile mass. Tenderness: There is abdominal tenderness in the left lower quadrant. There is no right CVA tenderness, left CVA tenderness, guarding or rebound. Negative signs include Fitzpatrick's sign, Rovsing's sign, McBurney's sign, psoas sign and obturator sign. Hernia: A hernia (non reducible and minimally tender) is present. Hernia is present in the left inguinal area. Genitourinary:     Penis: Lesions present. No phimosis, paraphimosis, hypospadias, erythema, tenderness, discharge or swelling. Scrotum/Testes: Normal. Cremasteric reflex is present. Epididymis:      Right: Normal.      Left: Normal.      Comments: Lesions on the penis consistent with condyloma acuminata   Musculoskeletal: Normal range of motion. Skin:     General: Skin is warm and dry. Capillary Refill: Capillary refill takes less than 2 seconds. Coloration: Skin is not jaundiced or pale. Findings: No bruising, erythema, lesion or rash. Neurological:      General: No focal deficit present. Mental Status: He is alert and oriented to person, place, and time.    Psychiatric: Mood and Affect: Mood normal.         Behavior: Behavior normal.         DIAGNOSTIC RESULTS   LABS:    Labs Reviewed   URINE RT REFLEX TO CULTURE - Abnormal; Notable for the following components:       Result Value    Clarity, UA CLOUDY (*)     Leukocyte Esterase, Urine SMALL (*)     All other components within normal limits    Narrative:     Performed at:  [facility] [address], [place], [address]   Phone [phone]   CBC WITH AUTO DIFFERENTIAL - Abnormal; Notable for the following components:    WBC 13.7 (*)     Hemoglobin 17.8 (*)     Hematocrit 53.4 (*)     .6 (*)     MCH 35.0 (*)     Neutrophils Absolute 10.3 (*)     All other components within normal limits    Narrative:     Performed at:  [facility] [address], [place], [address]   Phone [phone]   COMPREHENSIVE METABOLIC PANEL - Abnormal; Notable for the following components:    Sodium 135 (*)     Chloride 98 (*)     Glucose 215 (*)     Total Protein 8.3 (*)      (*)      (*)     All other components within normal limits    Narrative:     Performed at:  [facility] [address], [place], [address]   Phone [phone]   MICROSCOPIC URINALYSIS - Abnormal; Notable for the following components:    WBC, UA 11 (*)     All other components within normal limits    Narrative:     Performed at:  [facility] [address], [place], [address]   Phone [phone]   CULTURE, URINE       All other labs were within normal range or not returned as of this dictation. EKG: All EKG's are interpreted by the Emergency Department Physician in the absence of a cardiologist.  Please see their note for interpretation of EKG.       RADIOLOGY:   Non-plain film images such as CT, Ultrasound and MRI are read by the radiologist. Plain radiographic images are visualized and preliminarily

## 2020-02-28 LAB — URINE CULTURE, ROUTINE: NORMAL

## 2020-02-28 NOTE — ED PROVIDER NOTES
[facility] Emergency Department      Pt Name: Poonam Rashid  MRN: [id number]  [place] 1980  Date of evaluation: 2/27/2020  Provider: Taco Langston MD  I independently performed a history and physical on Poonam Rashid. All diagnostic, treatment, and disposition decisions were made by myself in conjunction with the advanced practice provider. HPI: Poonam Rashid presented with   Chief Complaint   Patient presents with    Inguinal Hernia     pt states he has had hernia on left side but now getting worse and having burning sensation to left groin     Poonam Rashid has no past medical history on file. He has a past surgical history that includes shoulder surgery (Right); back surgery; and Ankle surgery (Right, 10/03/2017). No current facility-administered medications on file prior to encounter. No current outpatient medications on file prior to encounter. PHYSICAL EXAM  Vitals: /66   Pulse 87   Temp 98 °F (36.7 °C)   Resp 15   Ht 5' 10\" (1.778 m)   Wt 160 lb (72.6 kg)   SpO2 100%   BMI 22.96 kg/m²   Constitutional:  42 y.o. male alert  HENT:  Atraumatic, oral mucosa moist  Neck:  No visible JVD, supple  Chest/Lungs:  Respiratory effort normal, clear, regular  Abdomen:  Non-distended, soft, NT except over the hernia that is reducible  Genital warts noted  Back:  No gross deformity  Extremities:  Normal tone and perfusion    Medical Decision Making and Plan: Briefly, this is an 42 y.o.male who presented with hernia pain, worse than usual for several weeks. Hx of hernia that has not caused much pain for many months. Pt does have a palpable hernia that is reducible. There is no evidence of incarceration or obstruction. Advised on other abnormality noted on CT imaging as well. We discussed warning signs of an incarcerated hernia including the inability to reduce the bulge, increased and constant pain, nausea, vomiting, fevers, chills and constipation.   He was given referral to a surgeon for further evaluation and possible repair in the future. Lety Christianson was given appropriate discharge instructions. Referral to follow up provider. For further details of Esha Tam Emergency Department encounter, please see documentation by advanced practice provider TAWNYA Haque. Labs Reviewed   URINE RT REFLEX TO CULTURE - Abnormal; Notable for the following components:       Result Value    Clarity, UA CLOUDY (*)     Leukocyte Esterase, Urine SMALL (*)     All other components within normal limits    Narrative:     Performed at:  [facility] [address], [place], [address]   Phone [phone]   CBC WITH AUTO DIFFERENTIAL - Abnormal; Notable for the following components:    WBC 13.7 (*)     Hemoglobin 17.8 (*)     Hematocrit 53.4 (*)     .6 (*)     MCH 35.0 (*)     Neutrophils Absolute 10.3 (*)     All other components within normal limits    Narrative:     Performed at:  [facility] [address], [place] [address] [facility]   Phone [phone]   COMPREHENSIVE METABOLIC PANEL - Abnormal; Notable for the following components:    Sodium 135 (*)     Chloride 98 (*)     Glucose 215 (*)     Total Protein 8.3 (*)      (*)      (*)     All other components within normal limits    Narrative:     Performed at:  [facility] [address], [place], [address]   Phone [phone]   MICROSCOPIC URINALYSIS - Abnormal; Notable for the following components:    WBC, UA 11 (*)     All other components within normal limits    Narrative:     Performed at:  [facility] [address], [place], [address]   Phone [phone]   CULTURE, URINE     RADIOLOGY:     CT ABDOMEN PELVIS W IV CONTRAST Additional Contrast? None   Preliminary Result   1.  Findings consistent with presence of small left inguinal hernia containing   mesenteric fat. There is minimal fluid/injection within the hernia. There   is visualization of mildly prominent dramatic vein coursing through the   region of the hernia as described above. 2. Findings suggestive of presence of varicosities about the upper/mid   abdomen. Exact etiology of the finding is indeterminate. 3. No evidence of bowel obstruction, intraperitoneal free air, or abscess. Discharge Medication List as of 2/27/2020  5:09 PM      START taking these medications    Details   naproxen (NAPROSYN) 500 MG tablet Take 1 tablet by mouth 2 times daily for 20 doses, Disp-20 tablet, R-0Print           FOLLOW UP:    [facility]) Pre-Services  [place] [address] Emergency Department  [address]  [address] [place] [address]  [phone]    If symptoms worsen    Elda Bowers MD  [address] [place], [place] [address]  [address] [place] [address]  [phone]      for surgical follow up    FINAL IMPRESSION:    1. Left inguinal hernia    2. Abdominal varicosities    3. Condyloma acuminata    4. Hyperglycemia    5.  Elevated LFTs       DISPOSITION Decision To Discharge 02/27/2020 05:02:27 PM       Guzman Brown MD  02/27/20 9665

## 2020-03-03 ENCOUNTER — OFFICE VISIT (OUTPATIENT)
Dept: SURGERY | Age: 40
End: 2020-03-03
Payer: COMMERCIAL

## 2020-03-03 ENCOUNTER — PREP FOR PROCEDURE (OUTPATIENT)
Dept: SURGERY | Age: 40
End: 2020-03-03

## 2020-03-03 VITALS
BODY MASS INDEX: 22.05 KG/M2 | WEIGHT: 154 LBS | HEIGHT: 70 IN | DIASTOLIC BLOOD PRESSURE: 72 MMHG | SYSTOLIC BLOOD PRESSURE: 118 MMHG

## 2020-03-03 PROCEDURE — 99204 OFFICE O/P NEW MOD 45 MIN: CPT | Performed by: SURGERY

## 2020-03-03 RX ORDER — SODIUM CHLORIDE 0.9 % (FLUSH) 0.9 %
10 SYRINGE (ML) INJECTION EVERY 12 HOURS SCHEDULED
Status: CANCELLED | OUTPATIENT
Start: 2020-03-03

## 2020-03-03 RX ORDER — SODIUM CHLORIDE 0.9 % (FLUSH) 0.9 %
10 SYRINGE (ML) INJECTION PRN
Status: CANCELLED | OUTPATIENT
Start: 2020-03-03

## 2020-03-03 ASSESSMENT — ENCOUNTER SYMPTOMS
EYE ITCHING: 0
BACK PAIN: 0
CHEST TIGHTNESS: 0
ABDOMINAL PAIN: 0
COLOR CHANGE: 0
EYE DISCHARGE: 0
ABDOMINAL DISTENTION: 0
APNEA: 0

## 2020-03-03 NOTE — PROGRESS NOTES
[place] [id number] [facility]ar[facility]  SUBJECTIVE:    Chief Complaint: left inguinal hernia    Jacobo aTm   1980   42 y.o. male presents with a tender reducible left inguinal hernia. It has been present for a year but tender for the last 2 months. It reduces but has been associated with increasing pain. Worse with lifting and straining and returns to baseline when at rest. The pain is sharp and does not radiate. Never happened before. Patient denies fevers, chills, nausea, vomiting, jaundice, dysuria, change in appetite, weight, or bowel movements. No other significant medical conditions and never had abdominal surgery before    Review of Systems   Constitutional: Negative for activity change and appetite change. HENT: Negative for congestion and dental problem. Eyes: Negative for discharge and itching. Respiratory: Negative for apnea and chest tightness. Cardiovascular: Negative for chest pain and leg swelling. Gastrointestinal: Negative for abdominal distention and abdominal pain. Genitourinary: Positive for difficulty urinating. Negative for dysuria. Musculoskeletal: Negative for arthralgias and back pain. Skin: Negative for color change and pallor. Allergic/Immunologic: Negative for environmental allergies and food allergies. Neurological: Negative for dizziness and facial asymmetry. Hematological: Negative for adenopathy. Does not bruise/bleed easily. Psychiatric/Behavioral: Negative for agitation and behavioral problems. History reviewed. No pertinent past medical history.   Past Surgical History:   Procedure Laterality Date    ANKLE SURGERY Right 10/03/2017    BACK SURGERY      SHOULDER SURGERY Right      Social History     Socioeconomic History    Marital status:      Spouse name: Not on file    Number of children: Not on file    Years of education: Not on file    Highest education level: Not on file   Occupational History    Not on file   Social Needs    Financial resource strain: Not on file    Food insecurity:     Worry: Not on file     Inability: Not on file    Transportation needs:     Medical: Not on file     Non-medical: Not on file   Tobacco Use    Smoking status: Current Every Day Smoker     Packs/day: 1.00     Types: Cigarettes    Smokeless tobacco: Never Used   Substance and Sexual Activity    Alcohol use: Yes     Comment: moderate    Drug use: Not Currently     Types: Marijuana     Comment: last used Saturday    Sexual activity: Yes   Lifestyle    Physical activity:     Days per week: Not on file     Minutes per session: Not on file    Stress: Not on file   Relationships    Social connections:     Talks on phone: Not on file     Gets together: Not on file     Attends Latter-day service: Not on file     Active member of club or organization: Not on file     Attends meetings of clubs or organizations: Not on file     Relationship status: Not on file    Intimate partner violence:     Fear of current or ex partner: Not on file     Emotionally abused: Not on file     Physically abused: Not on file     Forced sexual activity: Not on file   Other Topics Concern    Not on file   Social History Narrative    Not on file      History reviewed. No pertinent family history. Current Outpatient Medications   Medication Sig Dispense Refill    Naproxen Sodium (ALEVE PO) Take by mouth      IBUPROFEN PO Take by mouth       No current facility-administered medications for this visit. No Known Allergies     OBJECTIVE:  /72   Ht 5' 10\" (1.778 m)   Wt 154 lb (69.9 kg)   BMI 22.10 kg/m²    Physical Exam  Constitutional:       General: He is not in acute distress. Appearance: He is well-developed. He is not diaphoretic. HENT:      Head: Normocephalic and atraumatic. Eyes:      Conjunctiva/sclera: Conjunctivae normal.      Pupils: Pupils are equal, round, and reactive to light.    Neck:      Musculoskeletal: Normal 02/27/2020 17.8* 13.5 - 17.5 g/dL Final    Hematocrit 02/27/2020 53.4* 40.5 - 52.5 % Final    MCV 02/27/2020 104.6* 80.0 - 100.0 fL Final    MCH 02/27/2020 35.0* 26.0 - 34.0 pg Final    MCHC 02/27/2020 33.4  31.0 - 36.0 g/dL Final    RDW 02/27/2020 13.8  12.4 - 15.4 % Final    Platelets 40/86/8274 164  135 - 450 K/uL Final    MPV 02/27/2020 9.6  5.0 - 10.5 fL Final    PLATELET SLIDE REVIEW 02/27/2020 Adequate   Final    SLIDE REVIEW 02/27/2020 see below   Final    Slide review agrees with reported results    Neutrophils % 02/27/2020 75.4  % Final    Lymphocytes % 02/27/2020 15.8  % Final    Monocytes % 02/27/2020 6.2  % Final    Eosinophils % 02/27/2020 2.0  % Final    Basophils % 02/27/2020 0.6  % Final    Neutrophils Absolute 02/27/2020 10.3* 1.7 - 7.7 K/uL Final    Lymphocytes Absolute 02/27/2020 2.2  1.0 - 5.1 K/uL Final    Monocytes Absolute 02/27/2020 0.8  0.0 - 1.3 K/uL Final    Eosinophils Absolute 02/27/2020 0.3  0.0 - 0.6 K/uL Final    Basophils Absolute 02/27/2020 0.1  0.0 - 0.2 K/uL Final    Sodium 02/27/2020 135* 136 - 145 mmol/L Final    Potassium 02/27/2020 4.8  3.5 - 5.1 mmol/L Final    Chloride 02/27/2020 98* 99 - 110 mmol/L Final    CO2 02/27/2020 23  21 - 32 mmol/L Final    Anion Gap 02/27/2020 14  3 - 16 Final    Glucose 02/27/2020 215* 70 - 99 mg/dL Final    BUN 02/27/2020 10  7 - 20 mg/dL Final    CREATININE 02/27/2020 1.0  0.9 - 1.3 mg/dL Final    GFR Non- 02/27/2020 >60  >60 Final    Comment: >60 mL/min/1.73m2 EGFR, calc. for ages 25 and older using the  MDRD formula (not corrected for weight), is valid for stable  renal function.  GFR  02/27/2020 >60  >60 Final    Comment: Chronic Kidney Disease: less than 60 ml/min/1.73 sq.m. Kidney Failure: less than 15 ml/min/1.73 sq.m. Results valid for patients 18 years and older.       Calcium 02/27/2020 9.6  8.3 - 10.6 mg/dL Final    Total Protein 02/27/2020 8.3* 6.4 - 8.2 specialist  5. Will assist with primary care establishment at his postoperative visit as well    Elizabeth Vicente.  Deepak Castrejon MD, FACS  3/3/2020  2:45 PM

## 2020-03-05 NOTE — PROGRESS NOTES
Name_______________________________________Printed:____________________  Date and time of surgery_______3/4/20 0900_______________Arrival Time:________0730 main________   1. The instructions given regarding when and if a patient needs to stop oral intake prior to surgery varies. Follow the specific instructions you were given                  __xNothing to eat or to drink after Midnight the night before.                             ____Endoscopy patient follow your DRS instructions-generally you will be doing a part of the prep after Midnight                   ____Carbo loading or ERAS instructions will be given to select patients-if you have been given those instructions -please do the following                           The evening before your surgery after dinner before midnight drink 40 ounces of gatorade. If you are diabetic use sugar free. The morning of surgery drink 40 ounces of water. This needs to be finished 3 hours prior to your surgery start time. 2. Take the following pills with a small sip of water on the morning of surgery___________________________________________________                  Do not take blood pressure medications ending in pril or sartan the vicente prior to surgery or the morning of surgery_   3. Aspirin, Ibuprofen, Advil, Naproxen, Vitamin E and other Anti-inflammatory products and supplements should be stopped for 5 -7days before surgery or as directed by your physician. 4. Check with your Doctor regarding stopping Plavix, Coumadin,Eliquis, Lovenox,Effient,Pradaxa,Xarelto, Fragmin or other blood thinners and follow their instructions. 5. Do not smoke, and do not drink any alcoholic beverages 24 hours prior to surgery. This includes NA Beer. Refrain from the usage of any recreational drugs. 6. You may brush your teeth and gargle the morning of surgery. DO NOT SWALLOW WATER   7.  You MUST make arrangements for a responsible adult to stay on site while you are here and take you home at any given time. 18.  Please bring picture ID and insurance card. 19.  Visit our web site for additional information:  [facility] [email or web address]              20.During flu season no children under the age of 15 are permitted in the hospital for the safety of all patients. 21. If you take a long acting insulin in the evening only  take half of your usual  dose the night  before your procedure              22. If you use a c-pap please bring DOS if staying overnight,             23.For your convenience [facility] has a pharmacy on site to fill your prescriptions. 24. If you use oxygen and have a portable tank please bring it  with you the DOS             25. Bring a complete list of all your medications with name and dose include any supplements. 26. Other__________________________________________   *Please call pre admission testing if you any further questions   David Carr [address]    [facility] [address] [facility]  [phone]   [address] [place] [address]       All above information reviewed with patient in person or by phone. Patient verbalizes understanding. All questions and concerns addressed.                                                                                                  Patient/Rep_________pt___________                                                                                                                                    PRE OP INSTRUCTIONS

## 2020-03-06 ENCOUNTER — HOSPITAL ENCOUNTER (OUTPATIENT)
Age: 40
Setting detail: OUTPATIENT SURGERY
Discharge: HOME | End: 2020-03-06
Attending: SURGERY | Admitting: SURGERY
Payer: COMMERCIAL

## 2020-03-06 ENCOUNTER — ANESTHESIA (OUTPATIENT)
Dept: OPERATING ROOM | Age: 40
End: 2020-03-06
Payer: COMMERCIAL

## 2020-03-06 ENCOUNTER — ANESTHESIA EVENT (OUTPATIENT)
Dept: OPERATING ROOM | Age: 40
End: 2020-03-06
Payer: COMMERCIAL

## 2020-03-06 VITALS
BODY MASS INDEX: 22 KG/M2 | HEIGHT: 70 IN | HEART RATE: 87 BPM | WEIGHT: 153.7 LBS | SYSTOLIC BLOOD PRESSURE: 142 MMHG | TEMPERATURE: 97 F | RESPIRATION RATE: 18 BRPM | DIASTOLIC BLOOD PRESSURE: 84 MMHG | OXYGEN SATURATION: 97 %

## 2020-03-06 VITALS
SYSTOLIC BLOOD PRESSURE: 139 MMHG | RESPIRATION RATE: 13 BRPM | OXYGEN SATURATION: 100 % | DIASTOLIC BLOOD PRESSURE: 67 MMHG | TEMPERATURE: 97 F

## 2020-03-06 LAB
GLUCOSE BLD-MCNC: 93 MG/DL (ref 70–99)
PERFORMED ON: NORMAL

## 2020-03-06 PROCEDURE — 6360000002 HC RX W HCPCS: Performed by: NURSE ANESTHETIST, CERTIFIED REGISTERED

## 2020-03-06 PROCEDURE — 3600000009 HC SURGERY ROBOT BASE: Performed by: SURGERY

## 2020-03-06 PROCEDURE — 3600000019 HC SURGERY ROBOT ADDTL 15MIN: Performed by: SURGERY

## 2020-03-06 PROCEDURE — 6370000000 HC RX 637 (ALT 250 FOR IP): Performed by: ANESTHESIOLOGY

## 2020-03-06 PROCEDURE — 6360000002 HC RX W HCPCS: Performed by: ANESTHESIOLOGY

## 2020-03-06 PROCEDURE — C1781 MESH (IMPLANTABLE): HCPCS | Performed by: SURGERY

## 2020-03-06 PROCEDURE — 49650 LAP ING HERNIA REPAIR INIT: CPT | Performed by: SURGERY

## 2020-03-06 PROCEDURE — 2709999900 HC NON-CHARGEABLE SUPPLY: Performed by: SURGERY

## 2020-03-06 PROCEDURE — 2580000003 HC RX 258: Performed by: NURSE ANESTHETIST, CERTIFIED REGISTERED

## 2020-03-06 PROCEDURE — 6360000002 HC RX W HCPCS: Performed by: SURGERY

## 2020-03-06 PROCEDURE — 7100000001 HC PACU RECOVERY - ADDTL 15 MIN: Performed by: SURGERY

## 2020-03-06 PROCEDURE — 3700000001 HC ADD 15 MINUTES (ANESTHESIA): Performed by: SURGERY

## 2020-03-06 PROCEDURE — 2500000003 HC RX 250 WO HCPCS: Performed by: NURSE ANESTHETIST, CERTIFIED REGISTERED

## 2020-03-06 PROCEDURE — 2580000003 HC RX 258: Performed by: SURGERY

## 2020-03-06 PROCEDURE — 7100000000 HC PACU RECOVERY - FIRST 15 MIN: Performed by: SURGERY

## 2020-03-06 PROCEDURE — 7100000010 HC PHASE II RECOVERY - FIRST 15 MIN: Performed by: SURGERY

## 2020-03-06 PROCEDURE — S2900 ROBOTIC SURGICAL SYSTEM: HCPCS | Performed by: SURGERY

## 2020-03-06 PROCEDURE — 3700000000 HC ANESTHESIA ATTENDED CARE: Performed by: SURGERY

## 2020-03-06 PROCEDURE — 2500000003 HC RX 250 WO HCPCS: Performed by: SURGERY

## 2020-03-06 PROCEDURE — 7100000011 HC PHASE II RECOVERY - ADDTL 15 MIN: Performed by: SURGERY

## 2020-03-06 DEVICE — MESH HERN W10XL15CM PET PLA 70% CLLGN 30% GLYC LAP SELF: Type: IMPLANTABLE DEVICE | Site: ABDOMEN | Status: FUNCTIONAL

## 2020-03-06 RX ORDER — KETAMINE HCL IN NACL, ISO-OSM 100MG/10ML
SYRINGE (ML) INJECTION PRN
Status: DISCONTINUED | OUTPATIENT
Start: 2020-03-06 | End: 2020-03-06 | Stop reason: SDUPTHER

## 2020-03-06 RX ORDER — SODIUM CHLORIDE 0.9 % (FLUSH) 0.9 %
10 SYRINGE (ML) INJECTION PRN
Status: CANCELLED | OUTPATIENT
Start: 2020-03-06

## 2020-03-06 RX ORDER — MIDAZOLAM HYDROCHLORIDE 1 MG/ML
INJECTION INTRAMUSCULAR; INTRAVENOUS PRN
Status: DISCONTINUED | OUTPATIENT
Start: 2020-03-06 | End: 2020-03-06 | Stop reason: SDUPTHER

## 2020-03-06 RX ORDER — MAGNESIUM SULFATE HEPTAHYDRATE 500 MG/ML
INJECTION, SOLUTION INTRAMUSCULAR; INTRAVENOUS PRN
Status: DISCONTINUED | OUTPATIENT
Start: 2020-03-06 | End: 2020-03-06 | Stop reason: SDUPTHER

## 2020-03-06 RX ORDER — SODIUM CHLORIDE 0.9 % (FLUSH) 0.9 %
10 SYRINGE (ML) INJECTION PRN
Status: DISCONTINUED | OUTPATIENT
Start: 2020-03-06 | End: 2020-03-06 | Stop reason: HOSPADM

## 2020-03-06 RX ORDER — SODIUM CHLORIDE 0.9 % (FLUSH) 0.9 %
10 SYRINGE (ML) INJECTION EVERY 12 HOURS SCHEDULED
Status: CANCELLED | OUTPATIENT
Start: 2020-03-06

## 2020-03-06 RX ORDER — LABETALOL HYDROCHLORIDE 5 MG/ML
5 INJECTION, SOLUTION INTRAVENOUS EVERY 10 MIN PRN
Status: DISCONTINUED | OUTPATIENT
Start: 2020-03-06 | End: 2020-03-06 | Stop reason: HOSPADM

## 2020-03-06 RX ORDER — SODIUM CHLORIDE 9 MG/ML
INJECTION, SOLUTION INTRAVENOUS CONTINUOUS PRN
Status: DISCONTINUED | OUTPATIENT
Start: 2020-03-06 | End: 2020-03-06 | Stop reason: SDUPTHER

## 2020-03-06 RX ORDER — MAGNESIUM HYDROXIDE 1200 MG/15ML
LIQUID ORAL CONTINUOUS PRN
Status: COMPLETED | OUTPATIENT
Start: 2020-03-06 | End: 2020-03-06

## 2020-03-06 RX ORDER — OXYCODONE HYDROCHLORIDE 5 MG/1
5 TABLET ORAL EVERY 4 HOURS PRN
Qty: 20 TABLET | Refills: 0 | Status: SHIPPED | OUTPATIENT
Start: 2020-03-06 | End: 2020-03-13

## 2020-03-06 RX ORDER — HYDROMORPHONE HCL 110MG/55ML
0.5 PATIENT CONTROLLED ANALGESIA SYRINGE INTRAVENOUS EVERY 5 MIN PRN
Status: DISCONTINUED | OUTPATIENT
Start: 2020-03-06 | End: 2020-03-06 | Stop reason: HOSPADM

## 2020-03-06 RX ORDER — PROMETHAZINE HYDROCHLORIDE 25 MG/ML
6.25 INJECTION, SOLUTION INTRAMUSCULAR; INTRAVENOUS
Status: DISCONTINUED | OUTPATIENT
Start: 2020-03-06 | End: 2020-03-06 | Stop reason: HOSPADM

## 2020-03-06 RX ORDER — OXYCODONE HYDROCHLORIDE 5 MG/1
5 TABLET ORAL ONCE
Status: COMPLETED | OUTPATIENT
Start: 2020-03-06 | End: 2020-03-06

## 2020-03-06 RX ORDER — PROPOFOL 10 MG/ML
INJECTION, EMULSION INTRAVENOUS PRN
Status: DISCONTINUED | OUTPATIENT
Start: 2020-03-06 | End: 2020-03-06 | Stop reason: SDUPTHER

## 2020-03-06 RX ORDER — SODIUM CHLORIDE 0.9 % (FLUSH) 0.9 %
10 SYRINGE (ML) INJECTION EVERY 12 HOURS SCHEDULED
Status: DISCONTINUED | OUTPATIENT
Start: 2020-03-06 | End: 2020-03-06 | Stop reason: HOSPADM

## 2020-03-06 RX ORDER — SUCCINYLCHOLINE/SOD CL,ISO/PF 200MG/10ML
SYRINGE (ML) INTRAVENOUS PRN
Status: DISCONTINUED | OUTPATIENT
Start: 2020-03-06 | End: 2020-03-06 | Stop reason: SDUPTHER

## 2020-03-06 RX ORDER — FENTANYL CITRATE 50 UG/ML
25 INJECTION, SOLUTION INTRAMUSCULAR; INTRAVENOUS EVERY 5 MIN PRN
Status: COMPLETED | OUTPATIENT
Start: 2020-03-06 | End: 2020-03-06

## 2020-03-06 RX ORDER — SODIUM CHLORIDE 9 MG/ML
INJECTION, SOLUTION INTRAVENOUS CONTINUOUS
Status: CANCELLED | OUTPATIENT
Start: 2020-03-06

## 2020-03-06 RX ORDER — FENTANYL CITRATE 50 UG/ML
INJECTION, SOLUTION INTRAMUSCULAR; INTRAVENOUS PRN
Status: DISCONTINUED | OUTPATIENT
Start: 2020-03-06 | End: 2020-03-06 | Stop reason: SDUPTHER

## 2020-03-06 RX ORDER — BUPIVACAINE HYDROCHLORIDE AND EPINEPHRINE 5; 5 MG/ML; UG/ML
INJECTION, SOLUTION EPIDURAL; INTRACAUDAL; PERINEURAL
Status: COMPLETED | OUTPATIENT
Start: 2020-03-06 | End: 2020-03-06

## 2020-03-06 RX ORDER — ONDANSETRON 2 MG/ML
INJECTION INTRAMUSCULAR; INTRAVENOUS PRN
Status: DISCONTINUED | OUTPATIENT
Start: 2020-03-06 | End: 2020-03-06 | Stop reason: SDUPTHER

## 2020-03-06 RX ORDER — MIDAZOLAM HYDROCHLORIDE 1 MG/ML
1 INJECTION INTRAMUSCULAR; INTRAVENOUS
Status: DISCONTINUED | OUTPATIENT
Start: 2020-03-06 | End: 2020-03-06 | Stop reason: HOSPADM

## 2020-03-06 RX ORDER — LIDOCAINE HYDROCHLORIDE 20 MG/ML
INJECTION, SOLUTION EPIDURAL; INFILTRATION; INTRACAUDAL; PERINEURAL PRN
Status: DISCONTINUED | OUTPATIENT
Start: 2020-03-06 | End: 2020-03-06 | Stop reason: SDUPTHER

## 2020-03-06 RX ORDER — ROCURONIUM BROMIDE 10 MG/ML
INJECTION, SOLUTION INTRAVENOUS PRN
Status: DISCONTINUED | OUTPATIENT
Start: 2020-03-06 | End: 2020-03-06 | Stop reason: SDUPTHER

## 2020-03-06 RX ORDER — DEXAMETHASONE SODIUM PHOSPHATE 4 MG/ML
INJECTION, SOLUTION INTRA-ARTICULAR; INTRALESIONAL; INTRAMUSCULAR; INTRAVENOUS; SOFT TISSUE PRN
Status: DISCONTINUED | OUTPATIENT
Start: 2020-03-06 | End: 2020-03-06 | Stop reason: SDUPTHER

## 2020-03-06 RX ADMIN — Medication 30 MG: at 09:59

## 2020-03-06 RX ADMIN — ONDANSETRON 4 MG: 2 INJECTION INTRAMUSCULAR; INTRAVENOUS at 09:57

## 2020-03-06 RX ADMIN — OXYCODONE HYDROCHLORIDE 5 MG: 5 TABLET ORAL at 11:42

## 2020-03-06 RX ADMIN — CEFAZOLIN 2 G: 10 INJECTION, POWDER, FOR SOLUTION INTRAVENOUS at 08:52

## 2020-03-06 RX ADMIN — HYDROMORPHONE HYDROCHLORIDE 0.5 MG: 2 INJECTION, SOLUTION INTRAMUSCULAR; INTRAVENOUS; SUBCUTANEOUS at 10:59

## 2020-03-06 RX ADMIN — FENTANYL CITRATE 100 MCG: 50 INJECTION, SOLUTION INTRAMUSCULAR; INTRAVENOUS at 09:03

## 2020-03-06 RX ADMIN — FENTANYL CITRATE 25 MCG: 50 INJECTION, SOLUTION INTRAMUSCULAR; INTRAVENOUS at 10:35

## 2020-03-06 RX ADMIN — PROPOFOL 250 MG: 10 INJECTION, EMULSION INTRAVENOUS at 09:03

## 2020-03-06 RX ADMIN — Medication 120 MG: at 09:03

## 2020-03-06 RX ADMIN — MIDAZOLAM 2 MG: 1 INJECTION INTRAMUSCULAR; INTRAVENOUS at 08:55

## 2020-03-06 RX ADMIN — Medication 30 MG: at 09:15

## 2020-03-06 RX ADMIN — HYDROMORPHONE HYDROCHLORIDE 0.5 MG: 2 INJECTION, SOLUTION INTRAMUSCULAR; INTRAVENOUS; SUBCUTANEOUS at 11:07

## 2020-03-06 RX ADMIN — LIDOCAINE HYDROCHLORIDE 40 MG: 20 INJECTION, SOLUTION EPIDURAL; INFILTRATION; INTRACAUDAL; PERINEURAL at 09:22

## 2020-03-06 RX ADMIN — DEXAMETHASONE SODIUM PHOSPHATE 4 MG: 4 INJECTION, SOLUTION INTRAMUSCULAR; INTRAVENOUS at 09:21

## 2020-03-06 RX ADMIN — FENTANYL CITRATE 50 MCG: 50 INJECTION, SOLUTION INTRAMUSCULAR; INTRAVENOUS at 09:59

## 2020-03-06 RX ADMIN — FENTANYL CITRATE 25 MCG: 50 INJECTION, SOLUTION INTRAMUSCULAR; INTRAVENOUS at 10:43

## 2020-03-06 RX ADMIN — FENTANYL CITRATE 25 MCG: 50 INJECTION, SOLUTION INTRAMUSCULAR; INTRAVENOUS at 10:57

## 2020-03-06 RX ADMIN — ROCURONIUM BROMIDE 5 MG: 10 INJECTION, SOLUTION INTRAVENOUS at 09:03

## 2020-03-06 RX ADMIN — ROCURONIUM BROMIDE 45 MG: 10 INJECTION, SOLUTION INTRAVENOUS at 09:08

## 2020-03-06 RX ADMIN — FENTANYL CITRATE 25 MCG: 50 INJECTION, SOLUTION INTRAMUSCULAR; INTRAVENOUS at 10:38

## 2020-03-06 RX ADMIN — SODIUM CHLORIDE: 9 INJECTION, SOLUTION INTRAVENOUS at 10:00

## 2020-03-06 RX ADMIN — FENTANYL CITRATE 50 MCG: 50 INJECTION, SOLUTION INTRAMUSCULAR; INTRAVENOUS at 09:15

## 2020-03-06 RX ADMIN — SODIUM CHLORIDE: 9 INJECTION, SOLUTION INTRAVENOUS at 08:55

## 2020-03-06 RX ADMIN — MAGNESIUM SULFATE HEPTAHYDRATE 1 G: 500 INJECTION, SOLUTION INTRAMUSCULAR; INTRAVENOUS at 09:20

## 2020-03-06 RX ADMIN — SUGAMMADEX 200 MG: 100 INJECTION, SOLUTION INTRAVENOUS at 09:57

## 2020-03-06 RX ADMIN — MIDAZOLAM 2 MG: 1 INJECTION INTRAMUSCULAR; INTRAVENOUS at 09:00

## 2020-03-06 RX ADMIN — LIDOCAINE HYDROCHLORIDE 60 MG: 20 INJECTION, SOLUTION EPIDURAL; INFILTRATION; INTRACAUDAL; PERINEURAL at 09:03

## 2020-03-06 ASSESSMENT — PULMONARY FUNCTION TESTS
PIF_VALUE: 15
PIF_VALUE: 10
PIF_VALUE: 20
PIF_VALUE: 0
PIF_VALUE: 12
PIF_VALUE: 19
PIF_VALUE: 20
PIF_VALUE: 19
PIF_VALUE: 20
PIF_VALUE: 19
PIF_VALUE: 2
PIF_VALUE: 13
PIF_VALUE: 2
PIF_VALUE: 21
PIF_VALUE: 3
PIF_VALUE: 12
PIF_VALUE: 1
PIF_VALUE: 13
PIF_VALUE: 5
PIF_VALUE: 14
PIF_VALUE: 14
PIF_VALUE: 17
PIF_VALUE: 19
PIF_VALUE: 19
PIF_VALUE: 16
PIF_VALUE: 5
PIF_VALUE: 16
PIF_VALUE: 20
PIF_VALUE: 14
PIF_VALUE: 9
PIF_VALUE: 18
PIF_VALUE: 21
PIF_VALUE: 20
PIF_VALUE: 1
PIF_VALUE: 13
PIF_VALUE: 20
PIF_VALUE: 21
PIF_VALUE: 20
PIF_VALUE: 20
PIF_VALUE: 15
PIF_VALUE: 16
PIF_VALUE: 21
PIF_VALUE: 6
PIF_VALUE: 20
PIF_VALUE: 20
PIF_VALUE: 8
PIF_VALUE: 13
PIF_VALUE: 13
PIF_VALUE: 21
PIF_VALUE: 3
PIF_VALUE: 0
PIF_VALUE: 5
PIF_VALUE: 17
PIF_VALUE: 21
PIF_VALUE: 20
PIF_VALUE: 13
PIF_VALUE: 20
PIF_VALUE: 21
PIF_VALUE: 19
PIF_VALUE: 0
PIF_VALUE: 20
PIF_VALUE: 2
PIF_VALUE: 13
PIF_VALUE: 0
PIF_VALUE: 20
PIF_VALUE: 13
PIF_VALUE: 13
PIF_VALUE: 19
PIF_VALUE: 20
PIF_VALUE: 20
PIF_VALUE: 19
PIF_VALUE: 20
PIF_VALUE: 15
PIF_VALUE: 1

## 2020-03-06 ASSESSMENT — PAIN SCALES - GENERAL
PAINLEVEL_OUTOF10: 9
PAINLEVEL_OUTOF10: 8
PAINLEVEL_OUTOF10: 9
PAINLEVEL_OUTOF10: 8
PAINLEVEL_OUTOF10: 8
PAINLEVEL_OUTOF10: 7
PAINLEVEL_OUTOF10: 6

## 2020-03-06 ASSESSMENT — PAIN DESCRIPTION - LOCATION
LOCATION: ABDOMEN

## 2020-03-06 ASSESSMENT — PAIN DESCRIPTION - PAIN TYPE
TYPE: SURGICAL PAIN

## 2020-03-06 NOTE — PROGRESS NOTES
Pain improved to \"6/10\" after fentanyl and dilaudid, abd incisions CD&I, vss, weaned to RA,  Awake and oriented, phase 1 discharge criteria met

## 2020-03-06 NOTE — PROGRESS NOTES
Pt arrived to me from PACU to a same day surgery bay for phase 2 recovery monitoring and care prior to d/c in good condition. Pt is alert and oriented X4. Report received from Yanna Campbell, phase 1 recovery nurse.     S/p hernia repair  Will need to void prior to d/c home       Owen GONZALEZ, RN, [facility]  Pre-Op/Recovery   Same Day Surgery

## 2020-03-06 NOTE — ANESTHESIA PRE PROCEDURE
[facility] Department of Anesthesiology  Pre-Anesthesia Evaluation/Consultation       Name:  Jorge Garcia  : 1980  Age:  42 y.o. MRN:  [id number]  Date: 3/6/2020           Surgeon: Surgeon(s):  Rain Rojas MD    Procedure: Procedure(s):  ROBOT ASSISTED LAPAROSCOPIC LEFT AND POSSIBLE BILATERAL INGUINAL HERNIA REPAIR WITH MESH     No Known Allergies  Patient Active Problem List   Diagnosis    Closed displaced fracture of lateral malleolus of right fibula     History reviewed. No pertinent past medical history. Past Surgical History:   Procedure Laterality Date    ANKLE SURGERY Right 10/03/2017    BACK SURGERY      SHOULDER SURGERY Right      Social History     Tobacco Use    Smoking status: Current Every Day Smoker     Packs/day: 1.00     Types: Cigarettes    Smokeless tobacco: Never Used   Substance Use Topics    Alcohol use: Yes     Comment: moderate    Drug use: Not Currently     Types: Marijuana     Comment: last 3/4/20     Medications  No current facility-administered medications on file prior to encounter.       Current Outpatient Medications on File Prior to Encounter   Medication Sig Dispense Refill    IBUPROFEN PO Take by mouth      Naproxen Sodium (ALEVE PO) Take by mouth       Current Facility-Administered Medications   Medication Dose Route Frequency Provider Last Rate Last Dose    ceFAZolin (ANCEF) 2 g in dextrose 5 % 100 mL IVPB  2 g Intravenous On Call to [address] Jamal Cartagena MD        sodium chloride flush 0.9 % injection 10 mL  10 mL Intravenous 2 times per day Rain Rojas MD        sodium chloride flush 0.9 % injection 10 mL  10 mL Intravenous PRN Rain Rojas MD         Vital Signs (Current)   Vitals:    20 0748   BP: (!) 139/95   Pulse: 86   Resp: 18   Temp: 98.2 °F (36.8 °C)   SpO2: 100%     Vital Signs Statistics (for past 48 hrs)     Temp  Av.2 °F (36.8 °C)  Min: 98.2 °F (36.8 °C)   Min taken time: 20 0748  Max: 98.2 °F (36.8 °C)   Max taken time: 20 0748  Pulse  Av  Min: 86   Min taken time: 20 0748  Max: 86   Max taken time: 20 0748  Resp  Av  Min: 25   Min taken time: 20 0748  Max: 18   Max taken time: 20 0748  BP  Min: 139/95   Min taken time: 20 0748  Max: 139/95   Max taken time: 20 0748  SpO2  Av %  Min: 100 %   Min taken time: 20 0748  Max: 100 %   Max taken time: 20 0748    BP Readings from Last 3 Encounters:   20 (!) 139/95   20 118/72   20 135/66     BMI  Body mass index is 22.05 kg/m². Estimated body mass index is 22.05 kg/m² as calculated from the following:    Height as of this encounter: 5' 10\" (1.778 m). Weight as of this encounter: 153 lb 11.2 oz (69.7 kg). CBC   Lab Results   Component Value Date    WBC 13.7 2020    RBC 5.10 2020    HGB 17.8 2020    HCT 53.4 2020    .6 2020    RDW 13.8 2020     2020     CMP    Lab Results   Component Value Date     2020    K 4.8 2020    CL 98 2020    CO2 23 2020    BUN 10 2020    CREATININE 1.0 2020    GFRAA >60 2020    AGRATIO 1.2 2020    LABGLOM >60 2020    GLUCOSE 215 2020    PROT 8.3 2020    CALCIUM 9.6 2020    BILITOT 0.5 2020    ALKPHOS 126 2020     2020     2020     BMP    Lab Results   Component Value Date     2020    K 4.8 2020    CL 98 2020    CO2 23 2020    BUN 10 2020    CREATININE 1.0 2020    CALCIUM 9.6 2020    GFRAA >60 2020    LABGLOM >60 2020    GLUCOSE 215 2020     POCGlucose  No results for input(s): GLUCOSE in the last 72 hours.    Coags  No results found for: PROTIME, INR, APTT  HCG (If Applicable) No results found for: PREGTESTUR, PREGSERUM, HCG, HCGQUANT   ABGs No results found for: PHART, PO2ART, HYK6ZGO, WUQ3YUA, BEART, A0CIWIDV   Type & Screen (If Applicable)  No results found for: LABABO, LABRH                         BMI: Wt Readings from Last 3 Encounters:       NPO Status:   Date of last liquid consumption: 03/05/20   Time of last liquid consumption: 2230   Date of last solid food consumption: 03/05/20      Time of last solid consumption: 2100       Anesthesia Evaluation  Patient summary reviewed no history of anesthetic complications:   Airway: Mallampati: III  TM distance: >3 FB   Neck ROM: full   Dental:    (+) upper dentures      Pulmonary:Negative Pulmonary ROS and normal exam                               Cardiovascular:Negative CV ROS  Exercise tolerance: good (>4 METS),           Rhythm: regular  Rate: normal           Beta Blocker:  Not on Beta Blocker         Neuro/Psych:   (+) depression/anxiety             GI/Hepatic/Renal: Neg GI/Hepatic/Renal ROS            Endo/Other: Negative Endo/Other ROS                    Abdominal:           Vascular: negative vascular ROS. Anesthesia Plan      general     ASA 2       Induction: intravenous. MIPS: Postoperative opioids intended and Prophylactic antiemetics administered. Anesthetic plan and risks discussed with patient. Plan discussed with CRNA. This pre-anesthesia assessment may be used as a history and physical.    DOS STAFF ADDENDUM:    Pt seen and examined, chart reviewed (including anesthesia, drug and allergy history). No interval changes to history and physical examination. Anesthetic plan, risks, benefits, alternatives, and personnel involved discussed with patient. Questions and concerns addressed. Patient(family) verbalized an understanding and agrees to proceed.       Shawn Hutson MD  March 6, 2020  8:12 AM

## 2020-03-06 NOTE — PROGRESS NOTES
Discussed d/c instructions with pt and friend/family at bedside. Verbalized understanding. Provided pt/family with written instructions.     Spoke with wife on phone

## 2020-03-06 NOTE — PROGRESS NOTES
Gave fentanyl for pain, will monitor. Vss, belongings with pt. Pt placed Upper dentures back in mouth, prescription sent to pharmacy.

## 2020-03-06 NOTE — ANESTHESIA POSTPROCEDURE EVALUATION
[facility] Department of Anesthesiology  Post-Anesthesia Note       Name:  Roderick Dennis                                  Age:  42 y.o. MRN:  [id number]     Last Vitals & Oxygen Saturation: BP (!) 142/84   Pulse 87   Temp 97 °F (36.1 °C) (Temporal)   Resp 18   Ht 5' 10\" (1.778 m)   Wt 153 lb 11.2 oz (69.7 kg)   SpO2 97%   BMI 22.05 kg/m²   Patient Vitals for the past 4 hrs:   BP Temp Temp src Pulse Resp SpO2   03/06/20 1118 (!) 142/84 97 °F (36.1 °C) Temporal 87 18 97 %   03/06/20 1115 (!) 128/94 -- -- 88 18 94 %   03/06/20 1100 (!) 133/91 -- -- 88 16 100 %   03/06/20 1045 (!) 142/94 -- -- 92 16 99 %   03/06/20 1030 -- 97.3 °F (36.3 °C) Temporal 98 18 100 %   03/06/20 1025 (!) 141/92 -- -- 97 18 100 %   03/06/20 1020 (!) 145/91 -- -- 92 18 100 %   03/06/20 1015 (!) 147/102 97.2 °F (36.2 °C) Temporal 105 16 99 %       Level of consciousness:  Awake, alert    Respiratory: Respirations easy, no distress. Stable. Cardiovascular: Hemodynamically stable. Hydration: Adequate. PONV: Adequately managed. Post-op pain: Adequately controlled. Post-op assessment: Tolerated anesthetic well without complication. Complications:  None.     Leigh Jernigan MD  March 6, 2020   11:52 AM

## 2020-03-06 NOTE — PROGRESS NOTES
CLINICAL PHARMACY NOTE: MEDS TO [address] Select Patient?: No  Total # of Prescriptions Filled: 1   The following medications were delivered to the patient:  · Oxycodone 5mg  Total # of Interventions Completed: 0  Time Spent (min): 15    Additional Documentation:    Delivered to Patient mother    Srinivas Vallecillo CPhT

## 2020-03-07 NOTE — OP NOTE
then  positioned. Robot was brought in and docked. There was a minimal  dimple in the inguinal floor on the right side but not enough to qualify  as inguinal hernia and as such, the right side was not repaired. There  was a clear indirect left inguinal hernia identified and this would be  repaired. A preperitoneal flap along the level of the anterior superior  iliac spine on the left side was created with cautery and this flap was  then started medially  the anterior abdominal wall from the  flap and continued inferiorly to Umer's ligament. This was continued  medially to the pubic tubercle and inferiorly into the space of Retzius  to allow for wide overlap of the mesh. Dissection was then continued  out laterally to the vessels, which were protected from harm. There was  no femoral or direct hernia defect identified. Attention was then  turned laterally  the anterior abdominal wall from the  peritoneal flap. This was continued medially to the cord structures and  the hernia sac. The hernia sac was then carefully  from the  cord structures and reduced down to the level of the iliac vessels and   widely enough that tugging on the flap did not result in any  movement of the cord structures. The cord structures were clearly  identified and protected from harm. A 10 x 15 cm ProGrip mesh was  trimmed and rolled and placed into the preperitoneal space under direct  vision. It was then unrolled in a manner that it adequately covered the  femoral, direct and indirect potential hernia spaces with wide overlap. The mesh was flat without any tension or buckling and due to the  self-adhesive nature of the mesh, no additional fixation was necessary. The flap was then closed with a running barbed suture. The abdomen was  then allowed to desufflate and all trocars were then removed under  direct vision.   Local anesthetic was injected into the wounds and all  skin incisions were closed with 4-0 suture. The wounds were then  cleaned and dressed with Dermabond. The Cameron catheter had clear yellow  urine at the conclusion of the case and was removed. The patient  tolerated the procedure well and was transferred to PACU in stable  condition. SPECIMENS:  None. DRAINS:  None. COMPLICATIONS:  None. ESTIMATED BLOOD LOSS:  Less than 50 mL.         Jose Garduno MD    D: 03/06/2020 16:37:54       T: 03/07/2020 1:47:29     [email or web address]  Job#: [id number]     Doc#: [id number]    CC:

## 2020-03-11 ENCOUNTER — OFFICE VISIT (OUTPATIENT)
Dept: SURGERY | Age: 40
End: 2020-03-11

## 2020-03-11 VITALS — SYSTOLIC BLOOD PRESSURE: 140 MMHG | BODY MASS INDEX: 22.81 KG/M2 | WEIGHT: 159 LBS | DIASTOLIC BLOOD PRESSURE: 84 MMHG

## 2020-03-11 PROCEDURE — 99024 POSTOP FOLLOW-UP VISIT: CPT | Performed by: SURGERY

## 2020-03-11 RX ORDER — OXYCODONE HYDROCHLORIDE 5 MG/1
5 TABLET ORAL EVERY 4 HOURS PRN
Qty: 20 TABLET | Refills: 0 | Status: SHIPPED | OUTPATIENT
Start: 2020-03-11 | End: 2020-03-18

## 2020-03-11 NOTE — PATIENT INSTRUCTIONS
Recommend scheduled tylenol and ibuprofen around the clock for the next 7-10 days. Will give refill of oxycodone and counseled to use this medication sparingly and to wean as it will not be refilled. The medication loses efficacy the longer it is used and there is increased risk of dependence  Counseled that he is at risk for chronic inguinal pain as he had significant pain preoperatively.  If pain continues, may benefit from pain specialist referral and possible nerve ablation injections  No heavy lifting over 20lbs for 6 weeks total postop  Diet and activity as tolerated otherwise  Follow up with general surgery office as needed

## 2020-03-11 NOTE — PROGRESS NOTES
[facility] [facility]  SUBJECTIVE:    Dorice Labs   1980   42 y.o. male presents for routine postoperative followup after robot assisted laparoscopic repair of reducible left inguinal hernia with mesh on March 6, 2020. Primary complaint is left testicular pain. Has taken aleve with minimal relief, no tylenol, out of oxycodone. No other complaints    No past medical history on file.   Past Surgical History:   Procedure Laterality Date    ANKLE SURGERY Right 10/03/2017    BACK SURGERY      HERNIA REPAIR Left 3/6/2020    ROBOT ASSISTED LAPAROSCOPIC LEFT  INGUINAL HERNIA REPAIR WITH MESH performed by Rain Rojas MD at [facility] 21 Right      Social History     Socioeconomic History    Marital status:      Spouse name: Not on file    Number of children: Not on file    Years of education: Not on file    Highest education level: Not on file   Occupational History    Not on file   Social Needs    Financial resource strain: Not on file    Food insecurity     Worry: Not on file     Inability: Not on file    Transportation needs     Medical: Not on file     Non-medical: Not on file   Tobacco Use    Smoking status: Current Every Day Smoker     Packs/day: 1.00     Types: Cigarettes    Smokeless tobacco: Never Used   Substance and Sexual Activity    Alcohol use: Yes     Comment: moderate    Drug use: Not Currently     Types: Marijuana     Comment: last 3/4/20    Sexual activity: Yes   Lifestyle    Physical activity     Days per week: Not on file     Minutes per session: Not on file    Stress: Not on file   Relationships    Social connections     Talks on phone: Not on file     Gets together: Not on file     Attends Faith service: Not on file     Active member of club or organization: Not on file     Attends meetings of clubs or organizations: Not on file     Relationship status: Not on file    Intimate partner violence     Fear of HISTORY: Reason for exam:->left inguinal hernia Additional Contrast?->None Reason for Exam: left inguinal hernia. Acuity: Unknown Type of Exam: Unknown FINDINGS: Lower Chest: Very subtle density in posterior lower lobes may represent minimal dependent atelectasis. Organs: The liver, gallbladder, and spleen are unremarkable in appearance. Tiny splenules are visualized. Pancreas, adrenal glands, and kidneys are unremarkable in appearance. GI/Bowel: Stomach is relatively decompressed. There is mild nonspecific distention of a few loops of small bowel within the abdomen. No significant transition zone to suggest presence of small bowel obstruction. Normal appearing appendix is identified. No focal fluid collection to suggest presence of abscess. No evidence of intraperitoneal free air. There is suggestion of presence of varicosities about the upper abdomen in the region of the gastrohepatic ligament and left upper quadrant. Mild asymmetric prominence of the caliber with asymmetric enhancement of the left spermatic vein as compared to the right could be on the basis of increased flow associated with the suspected varicosities within the abdomen. Pelvis: Mild prominence of mural thickness of the urinary bladder may be related to incomplete distention. Prostate gland unremarkable in appearance. No free fluid identified within the pelvis. Peritoneum/Retroperitoneum: No significant abdominal/pelvic lymphadenopathy is identified. There are minimal atherosclerotic changes of the abdominal aorta with no evidence of aneurysm. There is atherosclerotic calcification of the common iliac arteries with suggestion of focal area of luminal narrowing involving a portion of the right common iliac artery (images [id number]). Bones/Soft Tissues: There is a small left inguinal hernia containing fat. There is minimal injection/fluid within the hernia sac (image 153).  Asymmetrically prominent left spermatic vein is visualized coursing through the left inguinal canal.  The prominence of the left spermatic vein may in part be related to the same process which has produced varicosities within the abdomen. There are extensive postsurgical changes associated with prior fusion procedure involving the T12 through L3 levels. Orthopedic hardware associated with the procedure appears intact. There is bony fusion across the L1-L2 intervertebral disc space. 1. Findings consistent with presence of small left inguinal hernia containing mesenteric fat. There is minimal fluid/injection within the hernia. There is visualization of mildly prominent dramatic vein coursing through the region of the hernia as described above. 2. Findings suggestive of presence of varicosities about the upper/mid abdomen. Exact etiology of the finding is indeterminate. 3. No evidence of bowel obstruction, intraperitoneal free air, or abscess. Pathology:  none    Assessment:  robot assisted laparoscopic repair of reducible left inguinal hernia with mesh on March 6, 2020    Plan:  Recommend scheduled tylenol and ibuprofen around the clock for the next 7-10 days. Will give refill of oxycodone and counseled to use this medication sparingly and to wean as it will not be refilled. The medication loses efficacy the longer it is used and there is increased risk of dependence  Counseled that he is at risk for chronic inguinal pain as he had significant pain preoperatively. If pain continues, may benefit from pain specialist referral and possible nerve ablation injections  No heavy lifting over 20lbs for 6 weeks total postop  Diet and activity as tolerated otherwise  Follow up with general surgery office as needed    Elian Beebe MD, FACS  3/11/2020  5:17 PM

## 2020-07-22 ENCOUNTER — VIRTUAL VISIT (OUTPATIENT)
Dept: PRIMARY CARE CLINIC | Age: 40
End: 2020-07-22
Payer: COMMERCIAL

## 2020-07-22 VITALS — HEIGHT: 70 IN | WEIGHT: 159 LBS | BODY MASS INDEX: 22.76 KG/M2

## 2020-07-22 PROCEDURE — 99202 OFFICE O/P NEW SF 15 MIN: CPT | Performed by: FAMILY MEDICINE

## 2020-07-22 ASSESSMENT — ENCOUNTER SYMPTOMS
CHEST TIGHTNESS: 0
EYE ITCHING: 0
VOICE CHANGE: 0
CONSTIPATION: 0
SHORTNESS OF BREATH: 0
SINUS PRESSURE: 0
EYE PAIN: 0
ABDOMINAL PAIN: 0
VOMITING: 0
EYE REDNESS: 0
TROUBLE SWALLOWING: 0
SORE THROAT: 0
COUGH: 0
PHOTOPHOBIA: 0
NAUSEA: 0
COLOR CHANGE: 0
RECTAL PAIN: 0
CHOKING: 0
BLOOD IN STOOL: 0
ANAL BLEEDING: 0
WHEEZING: 0
EYE DISCHARGE: 0
APNEA: 0
BACK PAIN: 0
FACIAL SWELLING: 0

## 2020-07-22 ASSESSMENT — PATIENT HEALTH QUESTIONNAIRE - PHQ9
SUM OF ALL RESPONSES TO PHQ QUESTIONS 1-9: 0
SUM OF ALL RESPONSES TO PHQ QUESTIONS 1-9: 0
1. LITTLE INTEREST OR PLEASURE IN DOING THINGS: 0
2. FEELING DOWN, DEPRESSED OR HOPELESS: 0
SUM OF ALL RESPONSES TO PHQ9 QUESTIONS 1 & 2: 0

## 2020-07-22 NOTE — PROGRESS NOTES
Subjective: Manda Casas is a 42 y.o. male being evaluated by a Virtual Visit (video visit) encounter to address concerns as mentioned above. A caregiver was present when appropriate. Due to this being a TeleHealth encounter (During KOXCI-94 public health emergency), evaluation of the following organ systems was limited: Vitals/Constitutional/EENT/Resp/CV/GI//MS/Neuro/Skin/Heme-Lymph-Imm. Pursuant to the emergency declaration under the [address] [place] [address] and the [employer] and [employer] Act, this Virtual Visit was conducted with patient's (and/or legal guardian's) consent, to reduce the patient's risk of exposure to COVID-19 and provide necessary medical care. The patient (and/or legal guardian) has also been advised to contact this office for worsening conditions or problems, and seek emergency medical treatment and/or call 911 if deemed necessary. Patient identification was verified at the start of the visit: Yes    Total time spent for this encounter: 20 minutes    Services were provided through a video synchronous discussion virtually to substitute for in-person clinic visit. Patient and provider were located at their individual homes. --Mathew Caldera MD on 7/22/2020 at 9:09 AM    An electronic signature was used to authenticate this note. Patient ID: Manda Casas is a 42 y.o. male. CC wart of penis  HPV  HPI 41 y/o male new to practice  He has had genital wart of penis for months  No treatment so far  He would like  To have the problem taken care of    He has h/o borderline bp reading  He has no sob or chest pain or leg edema history  He has no headaches     He is a smoker    History reviewed. No pertinent past medical history.   Past Surgical History:   Procedure Laterality Date    ANKLE SURGERY Right 10/03/2017    BACK SURGERY      HERNIA REPAIR Left 3/6/2020    ROBOT ASSISTED LAPAROSCOPIC LEFT  INGUINAL HERNIA REPAIR WITH MESH performed by Anthony Jose MD at [facility] 21 Right      Social History     Socioeconomic History    Marital status:      Spouse name: Not on file    Number of children: Not on file    Years of education: Not on file    Highest education level: Not on file   Occupational History    Not on file   Social Needs    Financial resource strain: Not on file    Food insecurity     Worry: Not on file     Inability: Not on file    Transportation needs     Medical: Not on file     Non-medical: Not on file   Tobacco Use    Smoking status: Current Every Day Smoker     Packs/day: 1.00     Types: Cigarettes    Smokeless tobacco: Never Used   Substance and Sexual Activity    Alcohol use: Yes     Comment: moderate    Drug use: Not Currently     Types: Marijuana     Comment: last 3/4/20    Sexual activity: Yes   Lifestyle    Physical activity     Days per week: Not on file     Minutes per session: Not on file    Stress: Not on file   Relationships    Social connections     Talks on phone: Not on file     Gets together: Not on file     Attends Worship service: Not on file     Active member of club or organization: Not on file     Attends meetings of clubs or organizations: Not on file     Relationship status: Not on file    Intimate partner violence     Fear of current or ex partner: Not on file     Emotionally abused: Not on file     Physically abused: Not on file     Forced sexual activity: Not on file   Other Topics Concern    Not on file   Social History Narrative    Not on file     No Known Allergies  History reviewed. No pertinent family history. Review of Systems   Constitutional: Negative for activity change, appetite change, chills, diaphoresis, fatigue, fever and unexpected weight change.    HENT: Negative for congestion, dental problem, drooling, ear discharge, ear pain, facial swelling, hearing loss, mouth sores, normal.   Abdominal:      General: Abdomen is flat. Neurological:      Mental Status: He is alert and oriented to person, place, and time. Psychiatric:         Mood and Affect: Mood normal.         Behavior: Behavior normal.         Thought Content: Thought content normal.         Judgment: Judgment normal.         Assessment:      1. Genital warts  uro consult  - JOSE C - Alma Oneill MD, [facility], [facility]    2. Elevated blood pressure reading  Low salt diet  Exercise  Needs follow up 4 weeks  Poss anti hypertensive meds  If bp not < 140/90    3. Tobacco abuse disorder  The patient is currently a smoker. Risks of smoking and second hand smoking risks discussed  With patient. Products available for smoking cessation have been discussed with patient.             Plan:              Nolbetro Hopson MD

## 2021-11-12 ENCOUNTER — INITIAL CONSULT (OUTPATIENT)
Dept: SURGERY | Age: 41
End: 2021-11-12
Payer: COMMERCIAL

## 2021-11-12 VITALS — WEIGHT: 156 LBS | DIASTOLIC BLOOD PRESSURE: 84 MMHG | SYSTOLIC BLOOD PRESSURE: 132 MMHG | BODY MASS INDEX: 22.38 KG/M2

## 2021-11-12 DIAGNOSIS — K40.90 RIGHT INGUINAL HERNIA: ICD-10-CM

## 2021-11-12 DIAGNOSIS — Z20.822 ENCOUNTER FOR SCREENING LABORATORY TESTING FOR COVID-19 VIRUS: Primary | ICD-10-CM

## 2021-11-12 PROCEDURE — 99213 OFFICE O/P EST LOW 20 MIN: CPT | Performed by: SURGERY

## 2021-11-12 RX ORDER — SODIUM CHLORIDE 9 MG/ML
25 INJECTION, SOLUTION INTRAVENOUS PRN
Status: CANCELLED | OUTPATIENT
Start: 2021-11-12

## 2021-11-12 RX ORDER — SODIUM CHLORIDE 0.9 % (FLUSH) 0.9 %
5-40 SYRINGE (ML) INJECTION PRN
Status: CANCELLED | OUTPATIENT
Start: 2021-11-12

## 2021-11-12 RX ORDER — SODIUM CHLORIDE 0.9 % (FLUSH) 0.9 %
5-40 SYRINGE (ML) INJECTION EVERY 12 HOURS SCHEDULED
Status: CANCELLED | OUTPATIENT
Start: 2021-11-12

## 2021-11-12 NOTE — PATIENT INSTRUCTIONS
1. We discussed the risks of surgery including bleeding, infection, damage to surrounding structures, conversion to open, hernia recurrence, chronic pain as well as anesthesia related complications. Increased risk of recurrence is associated with smoking, diabetes, obesity as well as heavy lifting over 20lbs sooner than 6 weeks after the surgery. We also discussed minimally invasive vs open technique. The benefits of the procedure include repair of the hernia, prevention of future incarceration and return to normal daily activity. Alternatives discussed include close observation. Details of the procedure were discussed and all questions answered. The patient understands, agrees, and wishes to proceed with minimally invasive procedure  2. Warning signs of an incarcerated hernia include inability to reduce the bulge, increased and constant pain, nausea, vomiting, fevers, chills and constipation. This is a surgical emergency and the patient should contact the office or present to an emergency department  3.  Will schedule for robot assisted laparoscopic right inguinal hernia repair with mesh with general anesthesia and as outpatient procedure

## 2021-11-12 NOTE — PROGRESS NOTES
[facility]  SUBJECTIVE:    Chief Complaint: Right inguinal hernia    Edmundo Gallo   1980   46 y.o. male presents with a right inguinal hernia. The patient is known to me after robot-assisted laparoscopic repair of reducible left inguinal hernia with mesh on March 6, 2020. During exploration the right floor was visualized and no hernia identified. The patient also clinically did not have symptoms on the right side at last office visit in March 2020. The patient does heavy lifting at his job in February 2021 noticed a tearing sensation and significant swelling that is reducible. He has had increasing pain over the last several months but unable to seek medical care due to insurance issues as well as his ill partner. The pain is become severe enough that he presents today for evaluation. Denies fevers chills chest pain dyspnea cough nausea vomiting jaundice dysuria change in appetite weight bowel movements or other complaints    No past medical history on file.   Past Surgical History:   Procedure Laterality Date    ANKLE SURGERY Right 10/03/2017    BACK SURGERY      HERNIA REPAIR Left 3/6/2020    ROBOT ASSISTED LAPAROSCOPIC LEFT  INGUINAL HERNIA REPAIR WITH MESH performed by Franne Brunner, MD at [address] [place] [address] Right      Social History     Socioeconomic History    Marital status:      Spouse name: Not on file    Number of children: Not on file    Years of education: Not on file    Highest education level: Not on file   Occupational History    Not on file   Tobacco Use    Smoking status: Current Every Day Smoker     Packs/day: 1.00     Types: Cigarettes    Smokeless tobacco: Never Used   Vaping Use    Vaping Use: Unknown   Substance and Sexual Activity    Alcohol use: Yes     Comment: moderate    Drug use: Not Currently     Types: Marijuana Lucianne Bars)     Comment: last 3/4/20    Sexual activity: Yes   Other Topics Concern    Not on file   Social History Narrative    Not on file     Social Determinants of Health     Financial Resource Strain:     Difficulty of Paying Living Expenses: Not on file   Food Insecurity:     Worried About Running Out of Food in the Last Year: Not on file    Janneth of Food in the Last Year: Not on file   Transportation Needs:     Lack of Transportation (Medical): Not on file    Lack of Transportation (Non-Medical): Not on file   Physical Activity:     Days of Exercise per Week: Not on file    Minutes of Exercise per Session: Not on file   Stress:     Feeling of Stress : Not on file   Social Connections:     Frequency of Communication with Friends and Family: Not on file    Frequency of Social Gatherings with Friends and Family: Not on file    Attends Yazdanism Services: Not on file    Active Member of [address] [place] [address] or Organizations: Not on file    Attends Club or Organization Meetings: Not on file    Marital Status: Not on file   Intimate Partner Violence:     Fear of Current or Ex-Partner: Not on file    Emotionally Abused: Not on file    Physically Abused: Not on file    Sexually Abused: Not on file   Housing Stability:     Unable to Pay for Housing in the Last Year: Not on file    Number of [place] in the Last Year: Not on file    Unstable Housing in the Last Year: Not on file      No family history on file. Current Outpatient Medications   Medication Sig Dispense Refill    Naproxen Sodium (ALEVE PO) Take by mouth      IBUPROFEN PO Take by mouth       No current facility-administered medications for this visit.       No Known Allergies     Review of Systems:  Review of systems performed and negative with the exception of the above findings    OBJECTIVE:  /84   Wt 156 lb (70.8 kg)   BMI 22.38 kg/m²      Physical Exam:  General appearance: alert, appears stated age, cooperative and no distress  Head: Normocephalic, without obvious abnormality, atraumatic  Lungs: clear to

## 2021-11-24 NOTE — PROGRESS NOTES
Name_______________________________________Printed:____________________  Date and time of surgery____11/30/2021   0730____________________Arrival Time:____0600   MAIN____________   1. The instructions given regarding when and if a patient needs to stop oral intake prior to surgery varies. Follow the specific instructions you were given                  _X__Nothing to eat or to drink after Midnight the night before.                   ____Carbo loading or ERAS instructions will be given to select patients-if you have been given those instructions -please do the following                           The evening before your surgery after dinner before midnight drink 40 ounces of gatorade. If you are diabetic use sugar free. The morning of surgery drink 40 ounces of water. This needs to be finished 3 hours prior to your surgery start time. 2. Take the following pills with a small sip of water on the morning of surgery___________________________________________________                  Do not take blood pressure medications ending in pril or sartan the vicente prior to surgery or the morning of surgery_   3. X  Aspirin, Ibuprofen, Advil, Naproxen, Vitamin E and other Anti-inflammatory products and supplements should be stopped for 5 -7days before surgery or as directed by your physician. 4. Check with your Doctor regarding stopping Plavix, Coumadin,Eliquis, Lovenox,Effient,Pradaxa,Xarelto, Fragmin or other blood thinners and follow their instructions. 5. Do not smoke, and do not drink any alcoholic beverages 24 hours prior to surgery. This includes NA Beer. Refrain from the usage of any recreational drugs. 6. You may brush your teeth and gargle the morning of surgery. DO NOT SWALLOW WATER   7. You MUST make arrangements for a responsible adult to stay on site while you are here and take you home after your surgery. You will not be allowed to leave alone or drive yourself home.   It is strongly suggested someone stay with you the first 24 hrs. Your surgery will be cancelled if you do not have a ride home. 8. A parent/legal guardian must accompany a child scheduled for surgery and plan to stay at the hospital until the child is discharged. Please do not bring other children with you. 9. Please wear simple, loose fitting clothing to the hospital.  F[place] not bring valuables (money, credit cards, checkbooks, etc.) Do not wear any makeup (including no eye makeup) or nail polish on your fingers or toes. 10. DO NOT wear any jewelry or piercings on day of surgery. All body piercing jewelry must be removed. 11. If you have ___dentures, they will be removed before going to the OR; we will provide you a container. If you wear ___contact lenses or ___glasses, they will be removed; please bring a case for them. 12. Please see your family doctor/pediatrician for a history & physical and/or concerning medications. Bring any test results/reports from your physician's office. PCP__________________Phone___________H&P Appt. Date________             13 If you  have a Living Will and Durable Power of  for Healthcare, please bring in a copy. 15. Notify your Surgeon if you develop any illness between now and surgery  time, cough, cold, fever, sore throat, nausea, vomiting, etc.  Please notify your surgeon if you experience dizziness, shortness of breath or blurred vision between now & the time of your surgery             15.X  DO NOT shave your operative site 96 hours prior to surgery. For face & neck surgery, men may use an electric razor 48 hours prior to surgery. 16. Shower the night before or morning of surgery using an antibacterial soap or as you have been instructed. 17. To provide excellent care visitors will be limited to one in the room at any given time. 18.  Please bring picture ID and insurance card.              19.  Visit our web site for additional information:  [email or web address]              20.During flu season no children under the age of 15 are permitted in the hospital for the safety of all patients. 21. If you take a long acting insulin in the evening only  take half of your usual  dose the night  before your procedure              22. If you use a c-pap please bring DOS if staying overnight,             23.For your convenience [facility] has a pharmacy on site to fill your prescriptions. 24. If you use oxygen and have a portable tank please bring it  with you the DOS             25. Bring a complete list of all your medications with name and dose include any supplements. 26. Other__________________________________________   *Please call pre admission testing if you any further questions   [facility]         [address]   [address]    [place] [id number]  [phone]   [facility] [address] MARVIN WHITAKER   [phone]           COVID TESTING    __X_ Covid test to be done 3-5 days prior to scheduled surgery -patient aware they are REQUIRED to bring a copy of the negative result DOS-if they receive a positive result to notify their surgeon         If known - indicate where patient is getting covid test done ___________________________________________________________    ___ [facility] - DOS    ___ Other___________________________________      Tomi Stover POLICY(subject to change)    There is a one visitor policy at [facility] for all surgeries and endoscopies. Whether the visitor can stay or will be asked to wait in the car will depend on the current policy and if social distancing can be maintained. The policy is subject to change at any time. Please make sure the visitor has a cell phone that is on,charged and able to accept calls, as this may be the way that the staff communicates with them. Pain management is NO VISITOR policyThe patients ride is expected to remain in the car with a cell phone for communication. If the ride is leaving the hospital grounds please make sure they are back in time for pickup. Have the patient inform the staff on arrival what their rides plans are while the patient is in the facility. At the MAIN there is one visitor allowed. Please note that the visitor policy is subject to change. All above information reviewed with patient in person or by phone. Patient verbalizes understanding. All questions and concerns addressed.                                                                                                  Patient/Rep___PATIENT_________________                                                                                                                                    PRE OP INSTRUCTIONS

## 2021-11-26 ENCOUNTER — HOSPITAL ENCOUNTER (OUTPATIENT)
Age: 41
Discharge: HOME | End: 2021-11-26
Payer: COMMERCIAL

## 2021-11-26 DIAGNOSIS — Z20.822 ENCOUNTER FOR SCREENING LABORATORY TESTING FOR COVID-19 VIRUS: ICD-10-CM

## 2021-11-26 PROCEDURE — U0003 INFECTIOUS AGENT DETECTION BY NUCLEIC ACID (DNA OR RNA); SEVERE ACUTE RESPIRATORY SYNDROME CORONAVIRUS 2 (SARS-COV-2) (CORONAVIRUS DISEASE [COVID-19]), AMPLIFIED PROBE TECHNIQUE, MAKING USE OF HIGH THROUGHPUT TECHNOLOGIES AS DESCRIBED BY CMS-2020-01-R: HCPCS

## 2021-11-26 PROCEDURE — U0005 INFEC AGEN DETEC AMPLI PROBE: HCPCS

## 2021-11-27 LAB — SARS-COV-2: NOT DETECTED

## 2021-11-30 ENCOUNTER — ANESTHESIA EVENT (OUTPATIENT)
Dept: OPERATING ROOM | Age: 41
End: 2021-11-30
Payer: COMMERCIAL

## 2021-11-30 ENCOUNTER — HOSPITAL ENCOUNTER (OUTPATIENT)
Age: 41
Setting detail: OUTPATIENT SURGERY
Discharge: HOME | End: 2021-11-30
Attending: SURGERY | Admitting: SURGERY
Payer: COMMERCIAL

## 2021-11-30 ENCOUNTER — ANESTHESIA (OUTPATIENT)
Dept: OPERATING ROOM | Age: 41
End: 2021-11-30
Payer: COMMERCIAL

## 2021-11-30 VITALS
SYSTOLIC BLOOD PRESSURE: 187 MMHG | OXYGEN SATURATION: 100 % | DIASTOLIC BLOOD PRESSURE: 96 MMHG | RESPIRATION RATE: 7 BRPM

## 2021-11-30 VITALS
DIASTOLIC BLOOD PRESSURE: 100 MMHG | BODY MASS INDEX: 20.76 KG/M2 | HEART RATE: 81 BPM | WEIGHT: 145.05 LBS | OXYGEN SATURATION: 96 % | HEIGHT: 70 IN | RESPIRATION RATE: 16 BRPM | SYSTOLIC BLOOD PRESSURE: 158 MMHG | TEMPERATURE: 97 F

## 2021-11-30 DIAGNOSIS — K40.90 RIGHT INGUINAL HERNIA: Primary | ICD-10-CM

## 2021-11-30 PROCEDURE — 2580000003 HC RX 258: Performed by: SURGERY

## 2021-11-30 PROCEDURE — 3600000019 HC SURGERY ROBOT ADDTL 15MIN: Performed by: SURGERY

## 2021-11-30 PROCEDURE — 7100000001 HC PACU RECOVERY - ADDTL 15 MIN: Performed by: SURGERY

## 2021-11-30 PROCEDURE — 2500000003 HC RX 250 WO HCPCS: Performed by: SURGERY

## 2021-11-30 PROCEDURE — 7100000010 HC PHASE II RECOVERY - FIRST 15 MIN: Performed by: SURGERY

## 2021-11-30 PROCEDURE — 2709999900 HC NON-CHARGEABLE SUPPLY: Performed by: SURGERY

## 2021-11-30 PROCEDURE — C1781 MESH (IMPLANTABLE): HCPCS | Performed by: SURGERY

## 2021-11-30 PROCEDURE — 7100000011 HC PHASE II RECOVERY - ADDTL 15 MIN: Performed by: SURGERY

## 2021-11-30 PROCEDURE — 7100000000 HC PACU RECOVERY - FIRST 15 MIN: Performed by: SURGERY

## 2021-11-30 PROCEDURE — 3700000000 HC ANESTHESIA ATTENDED CARE: Performed by: SURGERY

## 2021-11-30 PROCEDURE — 3700000001 HC ADD 15 MINUTES (ANESTHESIA): Performed by: SURGERY

## 2021-11-30 PROCEDURE — 49650 LAP ING HERNIA REPAIR INIT: CPT | Performed by: SURGERY

## 2021-11-30 PROCEDURE — S2900 ROBOTIC SURGICAL SYSTEM: HCPCS | Performed by: SURGERY

## 2021-11-30 PROCEDURE — 3600000009 HC SURGERY ROBOT BASE: Performed by: SURGERY

## 2021-11-30 PROCEDURE — 2500000003 HC RX 250 WO HCPCS: Performed by: NURSE ANESTHETIST, CERTIFIED REGISTERED

## 2021-11-30 PROCEDURE — 6360000002 HC RX W HCPCS: Performed by: SURGERY

## 2021-11-30 PROCEDURE — 6360000002 HC RX W HCPCS: Performed by: NURSE ANESTHETIST, CERTIFIED REGISTERED

## 2021-11-30 PROCEDURE — 6370000000 HC RX 637 (ALT 250 FOR IP): Performed by: ANESTHESIOLOGY

## 2021-11-30 PROCEDURE — 6360000002 HC RX W HCPCS: Performed by: ANESTHESIOLOGY

## 2021-11-30 PROCEDURE — 6360000002 HC RX W HCPCS

## 2021-11-30 DEVICE — MESH HERN W10XL15CM POLY POLYLACTIC ACID 70% CLLGN 30% GLYC: Type: IMPLANTABLE DEVICE | Status: FUNCTIONAL

## 2021-11-30 RX ORDER — HYDROMORPHONE HCL 110MG/55ML
PATIENT CONTROLLED ANALGESIA SYRINGE INTRAVENOUS PRN
Status: DISCONTINUED | OUTPATIENT
Start: 2021-11-30 | End: 2021-11-30 | Stop reason: SDUPTHER

## 2021-11-30 RX ORDER — MEPERIDINE HYDROCHLORIDE 25 MG/ML
12.5 INJECTION INTRAMUSCULAR; INTRAVENOUS; SUBCUTANEOUS EVERY 5 MIN PRN
Status: DISCONTINUED | OUTPATIENT
Start: 2021-11-30 | End: 2021-11-30 | Stop reason: HOSPADM

## 2021-11-30 RX ORDER — OXYCODONE HYDROCHLORIDE AND ACETAMINOPHEN 5; 325 MG/1; MG/1
1 TABLET ORAL
Status: COMPLETED | OUTPATIENT
Start: 2021-11-30 | End: 2021-11-30

## 2021-11-30 RX ORDER — LABETALOL HYDROCHLORIDE 5 MG/ML
5 INJECTION, SOLUTION INTRAVENOUS EVERY 10 MIN PRN
Status: DISCONTINUED | OUTPATIENT
Start: 2021-11-30 | End: 2021-11-30 | Stop reason: HOSPADM

## 2021-11-30 RX ORDER — PROPOFOL 10 MG/ML
INJECTION, EMULSION INTRAVENOUS PRN
Status: DISCONTINUED | OUTPATIENT
Start: 2021-11-30 | End: 2021-11-30 | Stop reason: SDUPTHER

## 2021-11-30 RX ORDER — LIDOCAINE HYDROCHLORIDE 20 MG/ML
INJECTION, SOLUTION EPIDURAL; INFILTRATION; INTRACAUDAL; PERINEURAL PRN
Status: DISCONTINUED | OUTPATIENT
Start: 2021-11-30 | End: 2021-11-30 | Stop reason: SDUPTHER

## 2021-11-30 RX ORDER — FENTANYL CITRATE 50 UG/ML
50 INJECTION, SOLUTION INTRAMUSCULAR; INTRAVENOUS EVERY 5 MIN PRN
Status: DISCONTINUED | OUTPATIENT
Start: 2021-11-30 | End: 2021-11-30 | Stop reason: HOSPADM

## 2021-11-30 RX ORDER — SODIUM CHLORIDE 0.9 % (FLUSH) 0.9 %
5-40 SYRINGE (ML) INJECTION EVERY 12 HOURS SCHEDULED
Status: DISCONTINUED | OUTPATIENT
Start: 2021-11-30 | End: 2021-11-30 | Stop reason: HOSPADM

## 2021-11-30 RX ORDER — MAGNESIUM HYDROXIDE 1200 MG/15ML
LIQUID ORAL CONTINUOUS PRN
Status: COMPLETED | OUTPATIENT
Start: 2021-11-30 | End: 2021-11-30

## 2021-11-30 RX ORDER — SUCCINYLCHOLINE/SOD CL,ISO/PF 200MG/10ML
SYRINGE (ML) INTRAVENOUS PRN
Status: DISCONTINUED | OUTPATIENT
Start: 2021-11-30 | End: 2021-11-30 | Stop reason: SDUPTHER

## 2021-11-30 RX ORDER — FENTANYL CITRATE 50 UG/ML
INJECTION, SOLUTION INTRAMUSCULAR; INTRAVENOUS PRN
Status: DISCONTINUED | OUTPATIENT
Start: 2021-11-30 | End: 2021-11-30 | Stop reason: SDUPTHER

## 2021-11-30 RX ORDER — SODIUM CHLORIDE 9 MG/ML
25 INJECTION, SOLUTION INTRAVENOUS PRN
Status: DISCONTINUED | OUTPATIENT
Start: 2021-11-30 | End: 2021-11-30 | Stop reason: HOSPADM

## 2021-11-30 RX ORDER — BUPIVACAINE HYDROCHLORIDE 5 MG/ML
INJECTION, SOLUTION EPIDURAL; INTRACAUDAL
Status: COMPLETED | OUTPATIENT
Start: 2021-11-30 | End: 2021-11-30

## 2021-11-30 RX ORDER — FENTANYL CITRATE 50 UG/ML
INJECTION, SOLUTION INTRAMUSCULAR; INTRAVENOUS
Status: COMPLETED
Start: 2021-11-30 | End: 2021-11-30

## 2021-11-30 RX ORDER — OXYCODONE HYDROCHLORIDE 5 MG/1
5 TABLET ORAL EVERY 4 HOURS PRN
Qty: 20 TABLET | Refills: 0 | Status: SHIPPED | OUTPATIENT
Start: 2021-11-30 | End: 2021-12-07

## 2021-11-30 RX ORDER — MIDAZOLAM HYDROCHLORIDE 1 MG/ML
INJECTION INTRAMUSCULAR; INTRAVENOUS PRN
Status: DISCONTINUED | OUTPATIENT
Start: 2021-11-30 | End: 2021-11-30 | Stop reason: SDUPTHER

## 2021-11-30 RX ORDER — HYDRALAZINE HYDROCHLORIDE 20 MG/ML
5 INJECTION INTRAMUSCULAR; INTRAVENOUS EVERY 10 MIN PRN
Status: DISCONTINUED | OUTPATIENT
Start: 2021-11-30 | End: 2021-11-30 | Stop reason: HOSPADM

## 2021-11-30 RX ORDER — ONDANSETRON 2 MG/ML
4 INJECTION INTRAMUSCULAR; INTRAVENOUS
Status: DISCONTINUED | OUTPATIENT
Start: 2021-11-30 | End: 2021-11-30 | Stop reason: HOSPADM

## 2021-11-30 RX ORDER — SODIUM CHLORIDE 0.9 % (FLUSH) 0.9 %
5-40 SYRINGE (ML) INJECTION PRN
Status: DISCONTINUED | OUTPATIENT
Start: 2021-11-30 | End: 2021-11-30 | Stop reason: HOSPADM

## 2021-11-30 RX ORDER — ONDANSETRON 2 MG/ML
INJECTION INTRAMUSCULAR; INTRAVENOUS PRN
Status: DISCONTINUED | OUTPATIENT
Start: 2021-11-30 | End: 2021-11-30 | Stop reason: SDUPTHER

## 2021-11-30 RX ORDER — DEXAMETHASONE SODIUM PHOSPHATE 4 MG/ML
INJECTION, SOLUTION INTRA-ARTICULAR; INTRALESIONAL; INTRAMUSCULAR; INTRAVENOUS; SOFT TISSUE PRN
Status: DISCONTINUED | OUTPATIENT
Start: 2021-11-30 | End: 2021-11-30 | Stop reason: SDUPTHER

## 2021-11-30 RX ORDER — HYDROMORPHONE HCL 110MG/55ML
0.5 PATIENT CONTROLLED ANALGESIA SYRINGE INTRAVENOUS EVERY 5 MIN PRN
Status: COMPLETED | OUTPATIENT
Start: 2021-11-30 | End: 2021-11-30

## 2021-11-30 RX ORDER — ROCURONIUM BROMIDE 10 MG/ML
INJECTION, SOLUTION INTRAVENOUS PRN
Status: DISCONTINUED | OUTPATIENT
Start: 2021-11-30 | End: 2021-11-30 | Stop reason: SDUPTHER

## 2021-11-30 RX ADMIN — SODIUM CHLORIDE: 9 INJECTION, SOLUTION INTRAVENOUS at 07:32

## 2021-11-30 RX ADMIN — OXYCODONE HYDROCHLORIDE AND ACETAMINOPHEN 1 TABLET: 5; 325 TABLET ORAL at 10:38

## 2021-11-30 RX ADMIN — HYDROMORPHONE HYDROCHLORIDE 0.5 MG: 2 INJECTION, SOLUTION INTRAMUSCULAR; INTRAVENOUS; SUBCUTANEOUS at 08:58

## 2021-11-30 RX ADMIN — HYDROMORPHONE HYDROCHLORIDE 0.5 MG: 2 INJECTION, SOLUTION INTRAMUSCULAR; INTRAVENOUS; SUBCUTANEOUS at 09:03

## 2021-11-30 RX ADMIN — DEXAMETHASONE SODIUM PHOSPHATE 10 MG: 4 INJECTION, SOLUTION INTRAMUSCULAR; INTRAVENOUS at 07:51

## 2021-11-30 RX ADMIN — MIDAZOLAM 2 MG: 1 INJECTION INTRAMUSCULAR; INTRAVENOUS at 07:44

## 2021-11-30 RX ADMIN — FENTANYL CITRATE 50 MCG: 50 INJECTION, SOLUTION INTRAMUSCULAR; INTRAVENOUS at 09:23

## 2021-11-30 RX ADMIN — HYDROMORPHONE HYDROCHLORIDE 0.5 MG: 2 INJECTION, SOLUTION INTRAMUSCULAR; INTRAVENOUS; SUBCUTANEOUS at 08:53

## 2021-11-30 RX ADMIN — HYDROMORPHONE HYDROCHLORIDE 0.5 MG: 2 INJECTION, SOLUTION INTRAMUSCULAR; INTRAVENOUS; SUBCUTANEOUS at 08:48

## 2021-11-30 RX ADMIN — ROCURONIUM BROMIDE 10 MG: 10 INJECTION, SOLUTION INTRAVENOUS at 07:44

## 2021-11-30 RX ADMIN — FENTANYL CITRATE 50 MCG: 50 INJECTION, SOLUTION INTRAMUSCULAR; INTRAVENOUS at 09:08

## 2021-11-30 RX ADMIN — PROPOFOL 200 MG: 10 INJECTION, EMULSION INTRAVENOUS at 07:44

## 2021-11-30 RX ADMIN — SODIUM CHLORIDE: 9 INJECTION, SOLUTION INTRAVENOUS at 08:28

## 2021-11-30 RX ADMIN — CEFAZOLIN 2000 MG: 10 INJECTION, POWDER, FOR SOLUTION INTRAVENOUS at 07:25

## 2021-11-30 RX ADMIN — HYDROMORPHONE HYDROCHLORIDE 2 MG: 2 INJECTION, SOLUTION INTRAMUSCULAR; INTRAVENOUS; SUBCUTANEOUS at 07:45

## 2021-11-30 RX ADMIN — ROCURONIUM BROMIDE 40 MG: 10 INJECTION, SOLUTION INTRAVENOUS at 07:48

## 2021-11-30 RX ADMIN — Medication 160 MG: at 07:44

## 2021-11-30 RX ADMIN — FENTANYL CITRATE 100 MCG: 50 INJECTION, SOLUTION INTRAMUSCULAR; INTRAVENOUS at 07:44

## 2021-11-30 RX ADMIN — LIDOCAINE HYDROCHLORIDE 50 MG: 20 INJECTION, SOLUTION EPIDURAL; INFILTRATION; INTRACAUDAL; PERINEURAL at 07:44

## 2021-11-30 RX ADMIN — ONDANSETRON 4 MG: 2 INJECTION INTRAMUSCULAR; INTRAVENOUS at 08:08

## 2021-11-30 ASSESSMENT — PAIN DESCRIPTION - LOCATION
LOCATION: ABDOMEN

## 2021-11-30 ASSESSMENT — PAIN SCALES - GENERAL
PAINLEVEL_OUTOF10: 6
PAINLEVEL_OUTOF10: 10
PAINLEVEL_OUTOF10: 6
PAINLEVEL_OUTOF10: 4
PAINLEVEL_OUTOF10: 10

## 2021-11-30 ASSESSMENT — PULMONARY FUNCTION TESTS
PIF_VALUE: 14
PIF_VALUE: 19
PIF_VALUE: 22
PIF_VALUE: 1
PIF_VALUE: 22
PIF_VALUE: 1
PIF_VALUE: 22
PIF_VALUE: 14
PIF_VALUE: 25
PIF_VALUE: 22
PIF_VALUE: 21
PIF_VALUE: 22
PIF_VALUE: 23
PIF_VALUE: 22
PIF_VALUE: 15
PIF_VALUE: 17
PIF_VALUE: 22
PIF_VALUE: 22
PIF_VALUE: 18
PIF_VALUE: 22
PIF_VALUE: 17
PIF_VALUE: 22
PIF_VALUE: 1
PIF_VALUE: 15
PIF_VALUE: 22
PIF_VALUE: 22
PIF_VALUE: 16
PIF_VALUE: 22
PIF_VALUE: 22
PIF_VALUE: 14
PIF_VALUE: 20
PIF_VALUE: 20
PIF_VALUE: 22
PIF_VALUE: 8
PIF_VALUE: 22
PIF_VALUE: 14
PIF_VALUE: 14
PIF_VALUE: 16
PIF_VALUE: 14
PIF_VALUE: 22
PIF_VALUE: 14
PIF_VALUE: 1
PIF_VALUE: 14
PIF_VALUE: 22
PIF_VALUE: 22
PIF_VALUE: 14
PIF_VALUE: 16
PIF_VALUE: 22
PIF_VALUE: 22
PIF_VALUE: 14
PIF_VALUE: 14
PIF_VALUE: 4
PIF_VALUE: 21
PIF_VALUE: 2

## 2021-11-30 ASSESSMENT — PAIN DESCRIPTION - PAIN TYPE
TYPE: SURGICAL PAIN

## 2021-11-30 ASSESSMENT — PAIN - FUNCTIONAL ASSESSMENT: PAIN_FUNCTIONAL_ASSESSMENT: 0-10

## 2021-11-30 NOTE — PROGRESS NOTES
Pt resting quietly in bed, awakens easily to voice, drowsy. VSS, O2 sats 97% on room air. Incisions to abdomen dry and intact, abdomen soft, ice pack in place. Pt seen by anesthesia, phase 1 criteria met. Will transfer pt to same day for discharge.

## 2021-11-30 NOTE — PROGRESS NOTES
CLINICAL PHARMACY NOTE: MEDS TO BEDS    Total # of Prescriptions Filled: 1   The following medications were delivered to the patient:  · Oxycodone 5 mg    Additional Documentation:    Delivered to Patient=signed  Radha Davis CPhT

## 2021-11-30 NOTE — OP NOTE
[address]                     [facility], [facility]                                OPERATIVE REPORT    PATIENT NAME: Nino Franco                  :        1980  MED REC NO:   [id number]                          ROOM:  ACCOUNT NO:   [de-identified]                           ADMIT DATE: 2021  PROVIDER:     Nico Oconnell MD    DATE OF PROCEDURE:  2021    PREOPERATIVE DIAGNOSIS:  Reducible right inguinal hernia. POSTOPERATIVE DIAGNOSIS:  Reducible right inguinal hernia. OPERATION PERFORMED:  Robot-assisted laparoscopic repair of right  inguinal hernia with mesh. SURGEON:  Nico Oconnell MD    ANESTHESIA:  General.    INDICATIONS:  This is a 72-year-old male who presented with a  symptomatic large reducible right inguinal hernia. The risks, benefits,  and alternative treatments of robot-assisted laparoscopic repair of the  right inguinal hernia with mesh were discussed. Details of the  procedure were discussed. All questions were answered. The patient  understood, agreed, and wished to proceed. OPERATIVE PROCEDURE:  The patient was brought to the operative suite and  laid in the supine position. General anesthesia was induced and well  tolerated. The patient's abdomen was prepped and draped in the usual  sterile fashion. After a proper time-out was performed verifying  operative site, procedure, and patient, a supraumbilical incision was  made with a scalpel. The umbilicus was elevated with a penetrating  towel clip and Veress needle placed into the peritoneal cavity. Using a  saline drip test, it was confirmed that we were in the peritoneal cavity  which was then insufflated with carbon dioxide to a pressure was 15 mmHg  which was well tolerated. An 8-mm robotic trocar was then placed  through this incision into the abdominal cavity. The scope was  inserted. The abdomen was inspected.   No injuries or abnormalities from  the initial trocar placement were noted. Additional robotic trocars  were then placed in the left and right abdomen under direct vision. The  patient was then positioned. The robot was brought in and docked. As  expected, there was a large right inguinal hernia in the indirect space  as identified. A peritoneal flap along the level of the anterior  superior iliac spine was created with cautery. The flap was   from the anterior abdominal wall starting medially and continued  inferiorly down to Umer's ligament. This was then continued medially  across the pubic tubercle and inferiorly into the space of Retzius to  allow for wide overlap of the mesh. Dissection was then continued out  laterally to the cord structures and the vessels. There was no femoral  or direct hernia identified. Attention was then turned to the right  side  the anterior abdominal wall from the preperitoneal flap. This was continued medially to the cord structures and a large indirect  hernia sac. The sac was large and significant dissection had to be  performed into the inguinal canal to free this down into the peritoneal  cavity. This was done largely with blunt dissection, careful sharp  dissection and cautery as such to protect the underlying cord vessels  and nerves. The flap was created wide enough such that tugging on the  peritoneal flap resulted in no movement of the cord structures. A 15 x  10 cm ProGrip mesh was then rolled after it was trimmed and placed in  the peritoneal space under direct vision. It was then unrolled such  that it adequately covered the direct, indirect and femoral hernia  spaces with wide overlap. Hemostasis had been verified. Due to the  self adhesive nature of the mesh, no additional fixation was necessary. The flap was closed with a running V-Loc barbed suture. The abdomen was  inspected. No injuries or abnormalities were noted.   The abdomen was  then allowed to desufflate. All trocars were then removed under direct  vision. Local anesthetic was injected in the wounds and all skin  incisions were closed with 4-0 sutures. The wounds were then cleaned  and dressed with Dermabond. The patient tolerated the procedure well  and was transferred to the PACU in stable condition. The Cameron catheter  had clear yellow urine at the conclusion of the case and was removed. SPECIMENS:  None. DRAINS:  None. COMPLICATIONS:  None. BLOOD LOSS:  Less than 50 mL.         Glo Santiago MD    D: 11/30/2021 12:43:03       T: 11/30/2021 13:35:21     [id number]  Job#: [id number]     Doc#: [id number]    CC:

## 2021-11-30 NOTE — PROGRESS NOTES
Pt arrived from OR to PACU, oral airway in place. O2 sats 100% on 6 L simple mask. BP elevated on arrival. Incisions to abdomen dry and intact, abdomen soft, ice pack in place. Will monitor and treat pain/BP per MAR.

## 2021-11-30 NOTE — PROGRESS NOTES
Patient in phase 2. VSS. complaining of some pain but comfortable at this time. Tolerating po fluids but requesting po food. Patient aware of need to void before d/c.

## 2021-11-30 NOTE — ANESTHESIA PRE PROCEDURE
[facility] Department of Anesthesiology  Pre-Anesthesia Evaluation/Consultation       Name:  Yue Kraft  : 1980  Age:  46 y.o. MRN:  [id number]  Date: 2021           Surgeon: Surgeon(s):  Latanya Lynch MD    Procedure: Procedure(s):  ROBOT ASSISTED LAPAROSCOPIC LEFT AND POSSIBLE BILATERAL INGUINAL HERNIA REPAIR WITH MESH     No Known Allergies  Patient Active Problem List   Diagnosis    Closed displaced fracture of lateral malleolus of right fibula    Left inguinal hernia    Right inguinal hernia     No past medical history on file.   Past Surgical History:   Procedure Laterality Date    ANKLE SURGERY Right 10/03/2017    BACK SURGERY      HARDWARE IN PLACE    HERNIA REPAIR Left 3/6/2020    ROBOT ASSISTED LAPAROSCOPIC LEFT  INGUINAL HERNIA REPAIR WITH MESH performed by Latanya Lynch MD at [facility] 21 Right      Social History     Tobacco Use    Smoking status: Current Every Day Smoker     Packs/day: 1.00     Types: Cigarettes    Smokeless tobacco: Never Used   Vaping Use    Vaping Use: Never used   Substance Use Topics    Alcohol use: Yes     Comment: moderate    Drug use: Yes     Types: Marijuana Maurita Handsome)     Medications  Current Facility-Administered Medications on File Prior to Visit   Medication Dose Route Frequency Provider Last Rate Last Admin    0.9 % sodium chloride infusion  25 mL IntraVENous PRN Latanya Lynch MD        ceFAZolin (ANCEF) 2000 mg in dextrose 5 % 50 mL IVPB  2,000 mg IntraVENous On Call to [address] Jamal Cartagena MD        sodium chloride flush 0.9 % injection 5-40 mL  5-40 mL IntraVENous 2 times per day Latanya Lynch MD        sodium chloride flush 0.9 % injection 5-40 mL  5-40 mL IntraVENous PRN Latanya Lynch MD         Current Outpatient Medications on File Prior to Visit   Medication Sig Dispense Refill    Naproxen Sodium (ALEVE PO) Take by mouth      IBUPROFEN PO Take by mouth No current facility-administered medications for this visit. No current outpatient medications on file. Facility-Administered Medications Ordered in Other Visits   Medication Dose Route Frequency Provider Last Rate Last Admin    0.9 % sodium chloride infusion  25 mL IntraVENous PRN Patricia Mcgee MD        ceFAZolin (ANCEF) 2000 mg in dextrose 5 % 50 mL IVPB  2,000 mg IntraVENous On Call to [address] Jamal Cartagena MD        sodium chloride flush 0.9 % injection 5-40 mL  5-40 mL IntraVENous 2 times per day Patricia Mcgee MD        sodium chloride flush 0.9 % injection 5-40 mL  5-40 mL IntraVENous PRN Patricia Mcgee MD         Vital Signs (Current)   There were no vitals filed for this visit. Vital Signs Statistics (for past 48 hrs)     Temp  Av.7 °F (36.5 °C)  Min: 97.7 °F (36.5 °C)   Min taken time: 21  Max: 97.7 °F (36.5 °C)   Max taken time: 21  Pulse  Av  Min: 88   Min taken time: 21  Max: 88   Max taken time: 21  Resp  Av  Min: 14   Min taken time: 21  Max: 14   Max taken time: 21  BP  Min: 130/90   Min taken time: 21  Max: 130/90   Max taken time: 21  SpO2  Av %  Min: 97 %   Min taken time: 21  Max: 97 %   Max taken time: 21    BP Readings from Last 3 Encounters:   21 (!) 130/90   21 132/84   20 (!) 140/84     BMI  There is no height or weight on file to calculate BMI. Estimated body mass index is 20.81 kg/m² as calculated from the following:    Height as of 21: 5' 10\" (1.778 m). Weight as of an earlier encounter on 21: 145 lb 0.8 oz (65.8 kg).     CBC   Lab Results   Component Value Date    WBC 13.7 2020    RBC 5.10 2020    HGB 17.8 2020    HCT 53.4 2020    .6 2020    RDW 13.8 2020     2020     CMP    Lab Results   Component Value Date     2020    K 4.8 2020 CL 98 02/27/2020    CO2 23 02/27/2020    BUN 10 02/27/2020    CREATININE 1.0 02/27/2020    GFRAA >60 02/27/2020    AGRATIO 1.2 02/27/2020    LABGLOM >60 02/27/2020    GLUCOSE 215 02/27/2020    PROT 8.3 02/27/2020    CALCIUM 9.6 02/27/2020    BILITOT 0.5 02/27/2020    ALKPHOS 126 02/27/2020     02/27/2020     02/27/2020     BMP    Lab Results   Component Value Date     02/27/2020    K 4.8 02/27/2020    CL 98 02/27/2020    CO2 23 02/27/2020    BUN 10 02/27/2020    CREATININE 1.0 02/27/2020    CALCIUM 9.6 02/27/2020    GFRAA >60 02/27/2020    LABGLOM >60 02/27/2020    GLUCOSE 215 02/27/2020     POCGlucose  No results for input(s): GLUCOSE in the last 72 hours. Coags  No results found for: PROTIME, INR, APTT  HCG (If Applicable) No results found for: PREGTESTUR, PREGSERUM, HCG, HCGQUANT   ABGs No results found for: PHART, PO2ART, LXX8VIH, LGI8OEB, BEART, I8HQLAKQ   Type & Screen (If Applicable)  No results found for: LABABO, LABRH                         BMI: Wt Readings from Last 3 Encounters:       NPO Status:                          Anesthesia Evaluation  Patient summary reviewed no history of anesthetic complications:   Airway: Mallampati: III  TM distance: >3 FB   Neck ROM: full   Dental:    (+) upper dentures      Pulmonary:Negative Pulmonary ROS and normal exam                               Cardiovascular:Negative CV ROS  Exercise tolerance: good (>4 METS),           Rhythm: regular  Rate: normal           Beta Blocker:  Not on Beta Blocker         Neuro/Psych:   (+) depression/anxiety             GI/Hepatic/Renal: Neg GI/Hepatic/Renal ROS            Endo/Other: Negative Endo/Other ROS                    Abdominal:             Vascular: negative vascular ROS. Other Findings:               Anesthesia Plan      general     ASA 2       Induction: intravenous. MIPS: Postoperative opioids intended and Prophylactic antiemetics administered.   Anesthetic plan and risks discussed with patient. Plan discussed with CRNA. This pre-anesthesia assessment may be used as a history and physical.    DOS STAFF ADDENDUM:    Pt seen and examined, chart reviewed (including anesthesia, drug and allergy history). No interval changes to history and physical examination. Anesthetic plan, risks, benefits, alternatives, and personnel involved discussed with patient. Questions and concerns addressed. Patient(family) verbalized an understanding and agrees to proceed.       Richard Turcios MD  November 30, 2021  7:14 AM

## 2021-11-30 NOTE — H&P
[facility] and Laparoscopic [facility]    I have reviewed the history and physical and examined the patient and find no relevant changes. I have reviewed with the patient and/or family the risks, benefits, and alternatives to the procedure. Flavio Lenz 6  Cassia Aguilar MD, FACS  11/30/2021  7:11 AM

## 2021-11-30 NOTE — BRIEF OP NOTE
[address] and Laparoscopic Surgery  Brief Operative Note  Pt Name: Sea Arnold  CSN: [id number]  YOB: 1980    Date of Procedure: 11/30/2021    Pre-operative Diagnosis: Reducible right inguinal hernia    Post-operative Diagnosis: same     Procedure: Robot assisted laparoscopic repair of right inguinal hernia with mesh    Surgeon(s):  Lindy Gonzalez MD     Surgical Assistant: Teagan Calle    Anesthesia:  General anesthesia    Findings: Full note dictated, Dictation Job Number: [id number]    Estimated Blood Loss: less than 50  ml    Complications: None    Specimens: none  * No specimens in log *     Implants:  Implant Name Type Inv. Item Serial No.  Lot No. LRB No. Used Action   MESH NICOL [id number] POLY POLYLACTIC ACID 70% CLLGN 30% GLYC  MESH NICOL [id number] POLY POLYLACTIC ACID 70% CLLGN 30% GLYC  [employer] COVIDIEN  SURGICAL-WD [id number] Right 1 Implanted       Drains: none   Urethral Catheter Latex 16 fr (Active)       Condition: stable     Disposition and Post-operative plan: PACU, home     Patricio Velazquez MD, FACS  11/30/2021  8:26 AM

## 2021-12-07 DIAGNOSIS — Z09 SURGERY FOLLOW-UP: Primary | ICD-10-CM

## 2021-12-07 RX ORDER — OXYCODONE HYDROCHLORIDE AND ACETAMINOPHEN 5; 325 MG/1; MG/1
1 TABLET ORAL EVERY 6 HOURS PRN
Qty: 20 TABLET | Refills: 0 | Status: SHIPPED | OUTPATIENT
Start: 2021-12-07 | End: 2021-12-27

## 2021-12-07 NOTE — TELEPHONE ENCOUNTER
Spoke to pt, he said the last time he had hernia surgery you gave a refill for percocet, he would like a refill this time if possible because he is trying to work. He has been taking Ibuprofen and it upsets his stomach.

## 2021-12-07 NOTE — TELEPHONE ENCOUNTER
Patient is s/p ROBOT ASSISTED LAPAROSCOPIC RIGHT INGUINAL HERNIA REPAIR WITH MESH  11/30/21, He states that he ran out of his Percocet on Saturday and was trying to just deal with the pain. He states the more he is up and moving and trying to do things, the more pain he has and he has to sit down. He is asking for Dr. Erna Allen to call in something stronger than Ibuprofen ( he said it doesn't need to be the Percocet)  .     Please advise

## 2021-12-07 NOTE — TELEPHONE ENCOUNTER
Please send a Rx request and I will sign it when I get to office.   patient that this will be his only refill

## 2021-12-10 ENCOUNTER — OFFICE VISIT (OUTPATIENT)
Dept: SURGERY | Age: 41
End: 2021-12-10

## 2021-12-10 VITALS — SYSTOLIC BLOOD PRESSURE: 120 MMHG | DIASTOLIC BLOOD PRESSURE: 72 MMHG | WEIGHT: 146 LBS | BODY MASS INDEX: 20.95 KG/M2

## 2021-12-10 DIAGNOSIS — Z48.89 ENCOUNTER FOR POSTOPERATIVE CARE: Primary | ICD-10-CM

## 2021-12-10 PROCEDURE — 99024 POSTOP FOLLOW-UP VISIT: CPT | Performed by: SURGERY

## 2021-12-10 RX ORDER — ONDANSETRON 4 MG/1
4 TABLET, FILM COATED ORAL EVERY 8 HOURS PRN
Qty: 20 TABLET | Refills: 0 | Status: SHIPPED | OUTPATIENT
Start: 2021-12-10 | End: 2022-04-28

## 2021-12-10 RX ORDER — PROMETHAZINE HYDROCHLORIDE 25 MG/1
25 SUPPOSITORY RECTAL EVERY 6 HOURS PRN
Qty: 10 SUPPOSITORY | Refills: 0 | Status: SHIPPED | OUTPATIENT
Start: 2021-12-10 | End: 2022-04-28

## 2021-12-10 NOTE — PATIENT INSTRUCTIONS
Nausea and vomiting appear unrelated to surgery. Will prescribe Zofran and Phenergan suppositories as needed to control nausea.  If not improved will need urgent evaluation  No heavy lifting over 20lbs for 6 weeks total postop  Diet and activity as tolerated otherwise  OK to return to work for light duty next week as scheduled  Follow up with general surgery office as needed  Provided contact with Dr. Estrella Eagle for primary care establishment

## 2021-12-10 NOTE — PROGRESS NOTES
[facility] and Laparoscopic Surgery  SUBJECTIVE:    Rei Anaya   1980   46 y.o. male presents for routine postoperative followup after robot-assisted laparoscopic repair of right inguinal hernia with mesh on November 30, 2021. The patient's incisional pain is improving. He has had nausea which is led to headaches over the last day. He has not had migraines before and does not have primary care establishment. No other complaints. No past medical history on file. Past Surgical History:   Procedure Laterality Date    ANKLE SURGERY Right 10/03/2017    BACK SURGERY      HARDWARE IN PLACE    HERNIA REPAIR Left 3/6/2020    ROBOT ASSISTED LAPAROSCOPIC LEFT  INGUINAL HERNIA REPAIR WITH MESH performed by Aisha Delatorre MD at [facility] Right 11/30/2021    ROBOT ASSISTED LAPAROSCOPIC RIGHT INGUINAL HERNIA REPAIR WITH MESH performed by Aisha Delatorre MD at [address]      Social History     Socioeconomic History    Marital status:      Spouse name: Not on file    Number of children: Not on file    Years of education: Not on file    Highest education level: Not on file   Occupational History    Not on file   Tobacco Use    Smoking status: Current Every Day Smoker     Packs/day: 1.00     Types: Cigarettes    Smokeless tobacco: Never Used   Vaping Use    Vaping Use: Never used   Substance and Sexual Activity    Alcohol use: Yes     Comment: moderate    Drug use: Yes     Types: Marijuana Valdene Reddy)    Sexual activity: Not on file   Other Topics Concern    Not on file   Social History Narrative    Not on file     Social Determinants of Health     Financial Resource Strain:     Difficulty of Paying Living Expenses: Not on file   Food Insecurity:     Worried About Running Out of Food in the Last Year: Not on file    Janneth of Food in the Last Year: Not on file   Transportation Needs:     Lack of Transportation (Medical):  Not on file  Lack of Transportation (Non-Medical): Not on file   Physical Activity:     Days of Exercise per Week: Not on file    Minutes of Exercise per Session: Not on file   Stress:     Feeling of Stress : Not on file   Social Connections:     Frequency of Communication with Friends and Family: Not on file    Frequency of Social Gatherings with Friends and Family: Not on file    Attends Islam Services: Not on file    Active Member of [address] [place] [address] or Organizations: Not on file    Attends Club or Organization Meetings: Not on file    Marital Status: Not on file   Intimate Partner Violence:     Fear of Current or Ex-Partner: Not on file    Emotionally Abused: Not on file    Physically Abused: Not on file    Sexually Abused: Not on file   Housing Stability:     Unable to Pay for Housing in the Last Year: Not on file    Number of [place] in the Last Year: Not on file    Unstable Housing in the Last Year: Not on file      No family history on file. Current Outpatient Medications   Medication Sig Dispense Refill    promethazine (PHENERGAN) 25 MG suppository Place 1 suppository rectally every 6 hours as needed for Nausea 10 suppository 0    ondansetron (ZOFRAN) 4 MG tablet Take 1 tablet by mouth every 8 hours as needed for Nausea or Vomiting 20 tablet 0    oxyCODONE-acetaminophen (ENDOCET) 5-325 MG per tablet Take 1 tablet by mouth every 6 hours as needed for Pain for up to 20 days. Intended supply: 5 days. Take lowest dose possible to manage pain 20 tablet 0    Naproxen Sodium (ALEVE PO) Take by mouth      IBUPROFEN PO Take by mouth       No current facility-administered medications for this visit.       No Known Allergies     Review of Systems:  Review of systems performed and negative with the exception of the above findings    OBJECTIVE:  /72   Wt 146 lb (66.2 kg)   BMI 20.95 kg/m²      Physical Exam:  General appearance: alert, appears stated age, cooperative and no distress  Abdomen: soft, non-distended, appropriate incisional tenderness, incisions clean dry and intact    Hospital Outpatient Visit on 11/26/2021   Component Date Value Ref Range Status    SARS-CoV-2 11/26/2021 Not Detected  Not detected Final    Comment: This test has been authorized by FDA under an  Emergency Use Authorization (EUA). This test is only authorized for the duration of the  time of declaration that circumstances exist justifying the  authorization of the emergency use of in vitro diagnostic  testing for detection of the SARS-CoV-2 virus  and/or diagnosis of COVID-19 infection under  section 564 (b)(1) of the Act, 21 U. S.C. 384YQQ-6 (b) (1),  unless the authorization is terminated or revoked sooner. Patient Fact https://[employer]/  Provider Fact https://[employer]/    METHODOLOGY: RT PCR         No results found. Pathology:  none    Assessment:  robot-assisted laparoscopic repair of right inguinal hernia with mesh on November 30, 2021    Plan:  Nausea and vomiting appear unrelated to surgery. Will prescribe Zofran and Phenergan suppositories as needed to control nausea. If not improved will need urgent evaluation  No heavy lifting over 20lbs for 6 weeks total postop  Diet and activity as tolerated otherwise  OK to return to work for light duty next week as scheduled  Follow up with general surgery office as needed  Provided contact with Dr. Ryan Valle for primary care establishment    [address] [place] [address] [address]  Vicki Barahona MD, FACS  12/10/2021  12:54 PM

## 2022-04-21 ENCOUNTER — NURSE TRIAGE (OUTPATIENT)
Dept: OTHER | Facility: CLINIC | Age: 42
End: 2022-04-21

## 2022-04-21 NOTE — TELEPHONE ENCOUNTER
[facility] with [employer]. Subjective: short of breath with exertion or excitement. Pt is a smoker. Hx of bronchitis    Current Symptoms: short of breath with exertion or excitement, dry cough, runny nose    Onset: 6 months ago; worsening    Associated Symptoms: reduced activity    Pain Severity: 0/10; N/A; none    Temperature: denies fever by unknown method    What has been tried: nothing    LMP: NA Pregnant: NA    Recommended disposition: Go to Office Now    Care advice provided, patient verbalizes understanding; denies any other questions or concerns; instructed to call back for any new or worsening symptoms. Patient/Caller agrees with recommended disposition; writer provided warm transfer to [facility] for appointment scheduling     Attention Provider: Thank you for allowing me to participate in the care of your patient. The patient was connected to triage in response to information provided to the ECC/PSC. Please do not respond through this encounter as the response is not directed to a shared pool.             Reason for Disposition   MILD difficulty breathing (e.g., minimal/no SOB at rest, SOB with walking, pulse < 100) of new-onset or worse than normal    Protocols used: BREATHING DIFFICULTY-ADULT-OH

## 2022-04-28 ENCOUNTER — OFFICE VISIT (OUTPATIENT)
Dept: FAMILY MEDICINE CLINIC | Age: 42
End: 2022-04-28
Payer: COMMERCIAL

## 2022-04-28 VITALS
OXYGEN SATURATION: 97 % | DIASTOLIC BLOOD PRESSURE: 80 MMHG | WEIGHT: 147 LBS | SYSTOLIC BLOOD PRESSURE: 140 MMHG | HEART RATE: 75 BPM | HEIGHT: 70 IN | RESPIRATION RATE: 20 BRPM | BODY MASS INDEX: 21.05 KG/M2

## 2022-04-28 DIAGNOSIS — F19.11 HISTORY OF SUBSTANCE ABUSE (HCC): ICD-10-CM

## 2022-04-28 DIAGNOSIS — I10 ESSENTIAL HYPERTENSION: ICD-10-CM

## 2022-04-28 DIAGNOSIS — Z72.0 TOBACCO ABUSE: ICD-10-CM

## 2022-04-28 DIAGNOSIS — Z76.89 ENCOUNTER TO ESTABLISH CARE: Primary | ICD-10-CM

## 2022-04-28 PROCEDURE — 99204 OFFICE O/P NEW MOD 45 MIN: CPT | Performed by: NURSE PRACTITIONER

## 2022-04-28 RX ORDER — LISINOPRIL 10 MG/1
10 TABLET ORAL DAILY
Qty: 30 TABLET | Refills: 0 | Status: SHIPPED | OUTPATIENT
Start: 2022-04-28 | End: 2022-05-24

## 2022-04-28 RX ORDER — VARENICLINE TARTRATE 1 MG/1
TABLET, FILM COATED ORAL
Qty: 180 TABLET | Refills: 1 | Status: SHIPPED | OUTPATIENT
Start: 2022-04-28

## 2022-04-28 SDOH — ECONOMIC STABILITY: FOOD INSECURITY: WITHIN THE PAST 12 MONTHS, THE FOOD YOU BOUGHT JUST DIDN'T LAST AND YOU DIDN'T HAVE MONEY TO GET MORE.: NEVER TRUE

## 2022-04-28 SDOH — ECONOMIC STABILITY: TRANSPORTATION INSECURITY
IN THE PAST 12 MONTHS, HAS THE LACK OF TRANSPORTATION KEPT YOU FROM MEDICAL APPOINTMENTS OR FROM GETTING MEDICATIONS?: NO

## 2022-04-28 SDOH — ECONOMIC STABILITY: TRANSPORTATION INSECURITY
IN THE PAST 12 MONTHS, HAS LACK OF TRANSPORTATION KEPT YOU FROM MEETINGS, WORK, OR FROM GETTING THINGS NEEDED FOR DAILY LIVING?: NO

## 2022-04-28 SDOH — ECONOMIC STABILITY: FOOD INSECURITY: WITHIN THE PAST 12 MONTHS, YOU WORRIED THAT YOUR FOOD WOULD RUN OUT BEFORE YOU GOT MONEY TO BUY MORE.: NEVER TRUE

## 2022-04-28 ASSESSMENT — PATIENT HEALTH QUESTIONNAIRE - PHQ9
1. LITTLE INTEREST OR PLEASURE IN DOING THINGS: 1
SUM OF ALL RESPONSES TO PHQ QUESTIONS 1-9: 8
3. TROUBLE FALLING OR STAYING ASLEEP: 1
2. FEELING DOWN, DEPRESSED OR HOPELESS: 2
8. MOVING OR SPEAKING SO SLOWLY THAT OTHER PEOPLE COULD HAVE NOTICED. OR THE OPPOSITE, BEING SO FIGETY OR RESTLESS THAT YOU HAVE BEEN MOVING AROUND A LOT MORE THAN USUAL: 0
SUM OF ALL RESPONSES TO PHQ QUESTIONS 1-9: 8
6. FEELING BAD ABOUT YOURSELF - OR THAT YOU ARE A FAILURE OR HAVE LET YOURSELF OR YOUR FAMILY DOWN: 0
SUM OF ALL RESPONSES TO PHQ9 QUESTIONS 1 & 2: 3
4. FEELING TIRED OR HAVING LITTLE ENERGY: 3
5. POOR APPETITE OR OVEREATING: 1
10. IF YOU CHECKED OFF ANY PROBLEMS, HOW DIFFICULT HAVE THESE PROBLEMS MADE IT FOR YOU TO DO YOUR WORK, TAKE CARE OF THINGS AT HOME, OR GET ALONG WITH OTHER PEOPLE: 1
9. THOUGHTS THAT YOU WOULD BE BETTER OFF DEAD, OR OF HURTING YOURSELF: 0
7. TROUBLE CONCENTRATING ON THINGS, SUCH AS READING THE NEWSPAPER OR WATCHING TELEVISION: 0

## 2022-04-28 ASSESSMENT — ENCOUNTER SYMPTOMS
EYE PAIN: 0
DIARRHEA: 0
SINUS PAIN: 0
SINUS PRESSURE: 0
COUGH: 0
SHORTNESS OF BREATH: 1
SORE THROAT: 0
VOMITING: 0
EYE REDNESS: 0
EYE DISCHARGE: 0
BACK PAIN: 1
ABDOMINAL PAIN: 0
ABDOMINAL DISTENTION: 0
WHEEZING: 0
NAUSEA: 0

## 2022-04-28 ASSESSMENT — SOCIAL DETERMINANTS OF HEALTH (SDOH): HOW HARD IS IT FOR YOU TO PAY FOR THE VERY BASICS LIKE FOOD, HOUSING, MEDICAL CARE, AND HEATING?: NOT HARD AT ALL

## 2022-04-28 NOTE — PROGRESS NOTES
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 He states that he does check it at home. He typically gets numbers in the 150s over 100s. He denies any significant headaches or dizziness. It is noted that he smokes 2 packs a day. States that he will get shortness of breath from time to time. Also states that he gets some generalized pains in his chest.  He denies any numbness or tingling. He does note that he has been under a lot of stress. His wife is currently dealing with alcoholism. He has been told that he cannot return to work until he passes his DOT physical which was concerning for him financially. He states he has also had some issues with his dogs getting out of the house. Believes this contributes to his blood pressure as well. He is agreeable to starting medication. Rickie Howe has significant history for musculoskeletal issues. Has had to have multiple surgeries on his back and other areas. He has also had a hernia repair. He uses to use heroin. He has been clean for 9 years. He does drink occasionally but states that he is able to manage this given his wife's alcoholism. Uses marijuana daily which he states helps with his back and joint pain. He states that his back was bad enough that he had tried to get disability in the past but was unsuccessful. He is interested in pursuing smoking cessation options. His wife was given Chantix and is wondering if he could start this. Review of Systems   Constitutional: Negative for chills and fever. HENT: Negative for ear discharge, ear pain, hearing loss, sinus pressure, sinus pain and sore throat. Eyes: Negative for pain, discharge and redness. Respiratory: Positive for shortness of breath. Negative for cough and wheezing. Cardiovascular: Positive for chest pain. Negative for palpitations.    Gastrointestinal: Negative for abdominal distention, abdominal pain, diarrhea, nausea and vomiting. Genitourinary: Negative for dysuria and hematuria. Musculoskeletal: Positive for arthralgias, back pain, neck pain and neck stiffness. Negative for gait problem, joint swelling and myalgias. Skin: Negative for rash. Neurological: Negative for dizziness, weakness, light-headedness, numbness and headaches. Psychiatric/Behavioral: Negative for decreased concentration, dysphoric mood, self-injury, sleep disturbance and suicidal ideas. The patient is not nervous/anxious. Physical Exam  Vitals reviewed. Constitutional:       General: He is not in acute distress. Appearance: Normal appearance. He is normal weight. HENT:      Head: Normocephalic and atraumatic. Right Ear: Tympanic membrane, ear canal and external ear normal.      Left Ear: Tympanic membrane, ear canal and external ear normal.      Nose: Nose normal.      Mouth/Throat:      Mouth: Mucous membranes are moist.      Pharynx: Oropharynx is clear. No posterior oropharyngeal erythema. Eyes:      General: No scleral icterus. Right eye: No discharge. Left eye: No discharge. Extraocular Movements: Extraocular movements intact. Pupils: Pupils are equal, round, and reactive to light. Neck:      Vascular: No carotid bruit. Cardiovascular:      Rate and Rhythm: Normal rate and regular rhythm. Pulses: Normal pulses. Heart sounds: Normal heart sounds. No murmur heard. No gallop. Pulmonary:      Effort: Pulmonary effort is normal.      Breath sounds: Normal breath sounds. No wheezing. Abdominal:      General: Bowel sounds are normal.      Palpations: Abdomen is soft. Tenderness: There is no abdominal tenderness. There is no guarding or rebound. Musculoskeletal:         General: Tenderness present. No swelling. Normal range of motion. Cervical back: Normal range of motion and neck supple. Lymphadenopathy:      Cervical: No cervical adenopathy.    Skin:     General: Skin is warm and dry. Capillary Refill: Capillary refill takes less than 2 seconds. Neurological:      Mental Status: He is alert and oriented to person, place, and time. Mental status is at baseline. Psychiatric:         Mood and Affect: Mood normal.         Behavior: Behavior normal.         Thought Content: Thought content normal.         Judgment: Judgment normal.               This dictation was generated by voice recognition computer software. Although all attempts are made to edit the dictation for accuracy, there may be errors in the transcription that are not intended. An electronic signature was used to authenticate this note.     --HOMER Gee - CNP

## 2022-04-28 NOTE — PATIENT INSTRUCTIONS
Patient Education        lisinopril  Pronunciation: ronni [place] [facility]  Brand: Prinivil, Qbrelis, Zestril  What is the most important information I should know about lisinopril? Do not use if you are pregnant, and tell your doctor right away if you become pregnant. If you have diabetes, do not use lisinopril together with any medication that contains aliskiren (ablood pressure medicine). Do not take lisinopril within 36 hours before or after taking medicine that contains sacubitril (such as Entresto). What is lisinopril? Lisinopril is an ACE inhibitor that is used to treat high blood pressure(hypertension) in adults and children who are at least 10years old. Lisinopril is also used to treat congestive heart failure in adults, or toimprove survival after a heart attack. Lisinopril may also be used for purposes not listed in this medication guide. What should I discuss with my healthcare provider before taking lisinopril? You should not use lisinopril if you are allergic to it, or if you:   have a history of angioedema;   recently took a heart medicine called sacubitril; or   are allergic to any other ACE inhibitor, such as benazepril, captopril, enalapril, fosinopril, moexipril, perindopril, quinapril, ramipril, or trandolapril. Do not take lisinopril within 36 hours before or after taking medicine that contains sacubitril (such as Entresto). If you have diabetes, do not use lisinopril together with any medication that contains aliskiren (ablood pressure medicine). You may also need to avoid taking lisinopril with aliskiren if you have kidney disease. Tell your doctor if you have ever had:   kidney disease (or if you are on dialysis);   liver disease; or   high levels of potassium in your blood. Do not use if you are pregnant, and tell your doctor right away if you become pregnant.  Lisinopril can cause injury or death to the unborn baby if you take themedicine during your second or third trimester. You should not breastfeed while using this medicine. How should I take lisinopril? Follow all directions on your prescription label and read all medication guides or instruction sheets. Your doctor may occasionally change your dose. Use themedicine exactly as directed. Drink plenty of water each day while you are taking this medicine. Lisinopril can be taken with or without food. Measure liquid medicine carefully. Use the dosing syringe provided, or use a medicine dose-measuringdevice (not a kitchen spoon). Your blood pressure will need to be checked often. Your kidney function andelectrolytes may also need to be checked. Call your doctor if you are sick with vomiting or diarrhea, or if you are sweating more than usual. You can easily become dehydrated while taking lisinopril. This can lead to very low blood pressure, a serious electrolyteimbalance, or kidney failure. If you need surgery, tell the surgeon ahead of time that you are usinglisinopril. If you have high blood pressure, keep using this medicine even if you feel well. High blood pressure often has no symptoms. You may need to use blood pressuremedicine for the rest of your life. Store at room temperature away from moisture and heat. Do not freeze the oral liquid. What happens if I miss a dose? Take the medicine as soon as you can, but skip the missed dose if it is almost time for your next dose. Do not take two doses at one time. What happens if I overdose? Seek emergency medical attention or call the Poison Help line at 1-576.889.2803. What should I avoid while taking lisinopril? Drinking alcohol can further lower your blood pressure and may increase certainside effects of lisinopril. Avoid becoming overheated or dehydrated during exercise, in hot weather, or by not drinking enough fluids. Lisinopril can decrease sweating and you may bemore prone to heat stroke.   Do not use potassium supplements or salt substitutes, unless your doctor hastold you to. Avoid getting up too fast from a sitting or lying position, or you may feeldizzy. What are the possible side effects of lisinopril? Get emergency medical help if you have signs of an allergic reaction: hives; severe stomach pain; difficulty breathing; swelling of your face, lips, tongue, or throat. You may be more likely to have an allergic reaction if youare -American. Call your doctor at once if you have:   a light-headed feeling, like you might pass out;   fever, sore throat;   high potassium --nausea, weakness, tingly feeling, chest pain, irregular heartbeats, loss of movement;   kidney problems --little or no urination, swelling in your feet or ankles, feeling tired or short of breath; or   liver problems --nausea, upper stomach pain, itching, tired feeling, loss of appetite, dark urine, isac-colored stools, jaundice (yellowing of the skin or eyes). Common side effects may include:   headache, dizziness;   cough; or   chest pain. This is not a complete list of side effects and others may occur. Call your doctor for medical advice about side effects. You may report side effects toFDA at 1-342-RHX-4106. What other drugs will affect lisinopril? Tell your doctor about all your other medicines, especially:   a diuretic or \"water pill\";   lithium;   gold injections to treat arthritis;   insulin or oral diabetes medicine;   a potassium supplement;   medicine to prevent organ transplant rejection --everolimus, sirolimus, tacrolimus, temsirolimus; or   NSAIDs (nonsteroidal anti-inflammatory drugs) --aspirin, ibuprofen (Advil, Motrin), naproxen (Aleve), celecoxib, diclofenac, indomethacin, meloxicam, and others. This list is not complete. Other drugs may affect lisinopril, including prescription and over-the-counter medicines, vitamins, and herbal products. Notall possible drug interactions are listed here. Where can I get more information?   Your pharmacist can provide more information about lisinopril. Remember, keep this and all other medicines out of the reach of children, never share your medicines with others, and use this medication only for the indication prescribed. Every effort has been made to ensure that the information provided by [address] is accurate, up-to-date, and complete, but no guarantee is made to that effect. Drug information contained herein may be time sensitive. [facility] information has been compiled for use by healthcare practitioners and consumers in the [place] and therefore [facility] does not warrant that uses outside of the [place] are appropriate, unless specifically indicated otherwise. [facility]'s drug information does not endorse drugs, diagnose patients or recommend therapy. [facility]'s drug information is an informational resource designed to assist licensed healthcare practitioners in caring for their patients and/or to serve consumers viewing this service as a supplement to, and not a substitute for, the expertise, skill, knowledge and judgment of healthcare practitioners. The absence of a warning for a given drug or drug combination in no way should be construed to indicate that the drug or drug combination is safe, effective or appropriate for any given patient. [facility] does not assume any responsibility for any aspect of healthcare administered with the aid of information [facility] provides. The information contained herein is not intended to cover all possible uses, directions, precautions, warnings, drug interactions, allergic reactions, or adverse effects. If you have questions about the drugs you are taking, check with yourdoctor, nurse or pharmacist.  Copyright 5034-3798 [address]: 15.03. Revision date:10/22/2019. Care instructions adapted under license by [facility] ([facility]).  If you have questions about a medical condition or this instruction, always ask your healthcare professional. [employer] disclaims any warranty or liability for your use of this information.

## 2022-05-12 ENCOUNTER — OFFICE VISIT (OUTPATIENT)
Dept: FAMILY MEDICINE CLINIC | Age: 42
End: 2022-05-12
Payer: COMMERCIAL

## 2022-05-12 VITALS
TEMPERATURE: 98.2 F | OXYGEN SATURATION: 97 % | WEIGHT: 136.6 LBS | RESPIRATION RATE: 20 BRPM | SYSTOLIC BLOOD PRESSURE: 118 MMHG | HEIGHT: 70 IN | HEART RATE: 84 BPM | DIASTOLIC BLOOD PRESSURE: 88 MMHG | BODY MASS INDEX: 19.56 KG/M2

## 2022-05-12 DIAGNOSIS — S76.111A STRAIN OF RIGHT QUADRICEPS, INITIAL ENCOUNTER: ICD-10-CM

## 2022-05-12 DIAGNOSIS — H61.23 IMPACTED CERUMEN, BILATERAL: ICD-10-CM

## 2022-05-12 DIAGNOSIS — I10 ESSENTIAL HYPERTENSION: Primary | ICD-10-CM

## 2022-05-12 LAB
A/G RATIO: 2.2 (ref 1.1–2.2)
ALBUMIN SERPL-MCNC: 5.1 G/DL (ref 3.4–5)
ALP BLD-CCNC: 123 U/L (ref 40–129)
ALT SERPL-CCNC: 60 U/L (ref 10–40)
ANION GAP SERPL CALCULATED.3IONS-SCNC: 14 MMOL/L (ref 3–16)
AST SERPL-CCNC: 50 U/L (ref 15–37)
BILIRUB SERPL-MCNC: 1 MG/DL (ref 0–1)
BUN BLDV-MCNC: 9 MG/DL (ref 7–20)
CALCIUM SERPL-MCNC: 10.2 MG/DL (ref 8.3–10.6)
CHLORIDE BLD-SCNC: 101 MMOL/L (ref 99–110)
CHOLESTEROL, TOTAL: 186 MG/DL (ref 0–199)
CO2: 22 MMOL/L (ref 21–32)
CREAT SERPL-MCNC: 0.8 MG/DL (ref 0.9–1.3)
GFR AFRICAN AMERICAN: >60
GFR NON-AFRICAN AMERICAN: >60
GLUCOSE BLD-MCNC: 82 MG/DL (ref 70–99)
HDLC SERPL-MCNC: 54 MG/DL (ref 40–60)
LDL CHOLESTEROL CALCULATED: 116 MG/DL
POTASSIUM SERPL-SCNC: 5 MMOL/L (ref 3.5–5.1)
SODIUM BLD-SCNC: 137 MMOL/L (ref 136–145)
TOTAL PROTEIN: 7.4 G/DL (ref 6.4–8.2)
TRIGL SERPL-MCNC: 78 MG/DL (ref 0–150)
VLDLC SERPL CALC-MCNC: 16 MG/DL

## 2022-05-12 PROCEDURE — 69210 REMOVE IMPACTED EAR WAX UNI: CPT | Performed by: NURSE PRACTITIONER

## 2022-05-12 PROCEDURE — 36415 COLL VENOUS BLD VENIPUNCTURE: CPT | Performed by: NURSE PRACTITIONER

## 2022-05-12 PROCEDURE — 99213 OFFICE O/P EST LOW 20 MIN: CPT | Performed by: NURSE PRACTITIONER

## 2022-05-12 ASSESSMENT — ENCOUNTER SYMPTOMS
WHEEZING: 0
SHORTNESS OF BREATH: 0

## 2022-05-12 NOTE — PROGRESS NOTES
Racheal Lutz (:  1980) is a 46 y.o. male,Established patient, here for evaluation of the following chief complaint(s):  Cerumen Impaction (bilateral) and Blood Pressure Check      ASSESSMENT/PLAN:  1. Essential hypertension  -Controlled on current dose of lisinopril. No changes today. We will perform fasting labs that were ordered at the last appointment. Follow-up in 6 months. 2. Impacted cerumen, bilateral  - - Removal of Impacted Cerumen with Instrumentation  - The bilateral canals were irrigated with water and hydrogen peroxide by the medical assistant. This yielded a large amount of tan cerumen. A lighted curette was then used to the right ear by me to further remove a large amount of tan cerumen. The canals are now visible with normal tympanic membranes. Curette was not needed on the left ear. 3. Strain of right quadriceps, initial encounter  -Presentation is consistent with a strain of the right quadriceps. Exercises are being provided. I NSAIDs as needed for pain. Ice. Follow-up as needed if no improvement. Return in about 6 months (around 2022) for Follow-up hypertension. SUBJECTIVE/OBJECTIVE:  DONNA  Christoph Leos presents today for blood pressure follow-up. He is due for fasting labs today as well. He does have 2 concerns. First, he states that he has impacted cerumen on both sides. He states that this happens to him from time to time. He states in the past he has been sent to an ENT to have his ear cleaned. He states that if he does not get cleared out, he develops ear infections. Patient is also inquiring about his left quad. He states that for a few weeks now, he has been experiencing pain in the quad. He is also experiencing weakness and a feeling that is going to give out. This occurs mostly with increased activity. 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  He did go back to work and states they went back to his normal lifting activities. Work is highly physical.  He denies any numbness or tingling. Denies any new back pain. Has chronic back issues which is required extensive surgery. The patient has been checking his blood pressure at home. He states it has been running in a better range than previous. Does not check every day. Most recently checked 4 days ago. Review of Systems   Constitutional: Negative for chills and fever. HENT: Positive for ear pain and hearing loss. Respiratory: Negative for shortness of breath and wheezing. Cardiovascular: Negative for chest pain, palpitations and leg swelling. Neurological: Positive for weakness. Negative for dizziness, light-headedness, numbness and headaches. Psychiatric/Behavioral: Negative for decreased concentration, dysphoric mood, self-injury and sleep disturbance. The patient is not nervous/anxious. Physical Exam  Constitutional:       General: He is not in acute distress. Appearance: Normal appearance. He is normal weight. HENT:      Right Ear: Tympanic membrane, ear canal and external ear normal. There is impacted cerumen. Left Ear: Tympanic membrane, ear canal and external ear normal. There is impacted cerumen. Cardiovascular:      Pulses: Normal pulses. Heart sounds: Normal heart sounds. No murmur heard. No gallop. Pulmonary:      Effort: Pulmonary effort is normal.      Breath sounds: Normal breath sounds. No wheezing. Musculoskeletal:         General: Swelling present. No tenderness. Normal range of motion. Comments: Swelling over the right quadriceps tendon. 5/5 strength with extension of the right lower extremity. 3/5 strength with flexion of the lower extremity. Left lower extremity is normal with full strength. Neurological:      Mental Status: He is alert and oriented to person, place, and time.    Psychiatric:         Mood and Affect: Mood normal. Behavior: Behavior normal.         Thought Content: Thought content normal.         Judgment: Judgment normal.               This dictation was generated by voice recognition computer software. Although all attempts are made to edit the dictation for accuracy, there may be errors in the transcription that are not intended. An electronic signature was used to authenticate this note.     --HOMER Blancas - CNP

## 2022-05-12 NOTE — PATIENT INSTRUCTIONS
Patient Education        Quadricep Muscle Strain: Rehab Exercises  Introduction  Here are some examples of exercises for you to try. The exercises may be suggested for a condition or for rehabilitation. Start each exercise slowly. Ease off the exercises if you start to have pain. You will be told when to start these exercises and which ones will work bestfor you. How to do the exercises  Standing quadriceps stretch    1. If you are not steady on your feet, hold on to a chair, counter, or wall. 2. Bend the knee of the leg you want to stretch, and reach behind you to grab the front of your foot or ankle with the hand on the same side. For example, if you are stretching your right leg, use your right hand. 3. Keeping your knees next to each other, pull your foot toward your buttock until you feel a gentle stretch across the front of your hip and down the front of your thigh. Your knee should be pointed directly to the ground, and not out to the side. 4. Hold the stretch for at least 15 to 30 seconds. 5. Repeat 2 to 4 times. Quadricep and hip flexor stretch (lying on side)    1. Lie on your side with your good leg flat on the floor and your hand supporting your head. 2. Bend your top leg, and reach behind you to grab the front of that foot or ankle with your other hand. 3. Stretch your leg back by pulling your foot toward your buttock. You will feel the stretch in the front of your thigh. If this causes stress on your knee, do not do this stretch. 4. Hold the stretch for at least 15 to 30 seconds. 5. Repeat 2 to 4 times. Hamstring stretch (lying down)    1. Lie flat on your back with your legs straight. If you feel discomfort in your back, place a small towel roll under your lower back. 2. Holding the back of your affected leg for support, lift your leg straight up and toward your body until you feel a stretch at the back of your thigh. 3. Hold the stretch for at least 30 seconds.   4. Repeat 2 to 4 times.  Follow-up care is a key part of your treatment and safety. Be sure to make and go to all appointments, and call your doctor if you are having problems. It's also a good idea to know your test results and keep alist of the medicines you take. Where can you learn more? Go to [email or web address] and sign in to your [employer] account. Enter N352 in the [employer] box to learn more about \"Quadricep Muscle Strain: Rehab Exercises. \"     If you do not have an account, please click on the \"Sign Up Now\" link. Current as of: July 1, 2021               Content Version: 13.2  © 2006-2022 [employer]. Care instructions adapted under license by [facility] ([facility]). If you have questions about a medical condition or this instruction, always ask your healthcare professional. [address] any warranty or liability for your use of this information.

## 2022-05-24 DIAGNOSIS — I10 ESSENTIAL HYPERTENSION: ICD-10-CM

## 2022-05-24 RX ORDER — LISINOPRIL 10 MG/1
10 TABLET ORAL DAILY
Qty: 30 TABLET | Refills: 2 | Status: SHIPPED | OUTPATIENT
Start: 2022-05-24 | End: 2022-08-28 | Stop reason: SDUPTHER

## 2022-08-28 DIAGNOSIS — I10 ESSENTIAL HYPERTENSION: ICD-10-CM

## 2022-08-29 RX ORDER — LISINOPRIL 10 MG/1
10 TABLET ORAL DAILY
Qty: 30 TABLET | Refills: 2 | Status: SHIPPED | OUTPATIENT
Start: 2022-08-29

## 2022-11-16 ENCOUNTER — OFFICE VISIT (OUTPATIENT)
Dept: FAMILY MEDICINE CLINIC | Age: 42
End: 2022-11-16
Payer: COMMERCIAL

## 2022-11-16 VITALS
DIASTOLIC BLOOD PRESSURE: 82 MMHG | BODY MASS INDEX: 19.47 KG/M2 | HEART RATE: 80 BPM | WEIGHT: 136 LBS | OXYGEN SATURATION: 98 % | HEIGHT: 70 IN | SYSTOLIC BLOOD PRESSURE: 126 MMHG

## 2022-11-16 DIAGNOSIS — Z09 HOSPITAL DISCHARGE FOLLOW-UP: ICD-10-CM

## 2022-11-16 DIAGNOSIS — F10.920 ALCOHOLIC INTOXICATION WITHOUT COMPLICATION (HCC): ICD-10-CM

## 2022-11-16 DIAGNOSIS — R79.89 ELEVATED LFTS: ICD-10-CM

## 2022-11-16 DIAGNOSIS — I10 ESSENTIAL HYPERTENSION: Primary | ICD-10-CM

## 2022-11-16 LAB
ALBUMIN SERPL-MCNC: 4.5 G/DL (ref 3.4–5)
ALP BLD-CCNC: 130 U/L (ref 40–129)
ALT SERPL-CCNC: 38 U/L (ref 10–40)
AST SERPL-CCNC: 55 U/L (ref 15–37)
BILIRUB SERPL-MCNC: 0.8 MG/DL (ref 0–1)
BILIRUBIN DIRECT: <0.2 MG/DL (ref 0–0.3)
BILIRUBIN, INDIRECT: ABNORMAL MG/DL (ref 0–1)
TOTAL PROTEIN: 7.1 G/DL (ref 6.4–8.2)

## 2022-11-16 PROCEDURE — 36415 COLL VENOUS BLD VENIPUNCTURE: CPT | Performed by: NURSE PRACTITIONER

## 2022-11-16 PROCEDURE — 3074F SYST BP LT 130 MM HG: CPT | Performed by: NURSE PRACTITIONER

## 2022-11-16 PROCEDURE — 3078F DIAST BP <80 MM HG: CPT | Performed by: NURSE PRACTITIONER

## 2022-11-16 PROCEDURE — 99214 OFFICE O/P EST MOD 30 MIN: CPT | Performed by: NURSE PRACTITIONER

## 2022-11-16 RX ORDER — LISINOPRIL 10 MG/1
10 TABLET ORAL DAILY
Qty: 90 TABLET | Refills: 1 | Status: SHIPPED | OUTPATIENT
Start: 2022-11-16

## 2022-11-16 ASSESSMENT — ENCOUNTER SYMPTOMS
ABDOMINAL DISTENTION: 0
ABDOMINAL PAIN: 0
COUGH: 0
SORE THROAT: 0
VOMITING: 0
EYE PAIN: 0
EYE DISCHARGE: 0
WHEEZING: 0
NAUSEA: 0
SINUS PRESSURE: 0
DIARRHEA: 0
EYE REDNESS: 0
SINUS PAIN: 0
SHORTNESS OF BREATH: 0

## 2022-11-16 NOTE — PROGRESS NOTES
Ras Turpin (:  1980) is a 38 y.o. male,Established patient, here for evaluation of the following chief complaint(s):  Hypertension (ROUTINE HTN ) and Follow-up (ED FOLLOW UP, 2022  )      ASSESSMENT/PLAN:  1. Essential hypertension  -Controlled on current dose of lisinopril. No changes today. Refill. Follow-up in 6 months for physical with fasting labs, or sooner if needed if blood pressure is consistently high.  -     lisinopril (PRINIVIL;ZESTRIL) 10 MG tablet; Take 1 tablet by mouth daily, Disp-90 tablet, R-1Normal  2. Elevated LFTs  -Elevated at last appointment. Recheck today. Emphasized the importance of limiting alcohol intake to prevent LFTs from increasing.  -     Hepatic Function Panel; Future  3. Alcoholic intoxication without complication [facility] notes and results reviewed. Patient denies any alcohol problem. Educated to follow-up as needed if he feels he needs resources. 4. Hospital discharge follow-up  -Emergency room notes and results reviewed. No additional intervention at this time. Return in about 6 months (around 2023) for Annual Physical/Fasting Labs. SUBJECTIVE/OBJECTIVE:  DONNA  Donald Silva presents today for blood pressure and emergency room follow-ups. He continues to take lisinopril without issue. Does check his blood pressure at home. He states that when he stressed out it will run quite high, but otherwise typically is in the normal range. He continues to tolerate lisinopril without side effects. He states that he has not yet started the Chantix because he is waiting for his wife to start it. Feels that he will have better success if they take it at the same time. He was seen in the ER with alcohol intoxication at [facility]. Was observed and then discharged. He states that at that time he was under a lot of stress. Had taken in a rescue dog which was having issues with his dog. Led to lots of issues related to the dogs attacking each other. Also was stressed out at work. The dog is no longer in their house and he feels that this is helpful for stress level. States that he does not drink this heavily on a routine basis. Denies any significant issues with his mood. He does state he is going to pursue medical marijuana as he feels that this may help when he does have issues, but he declines additional intervention at this time. LFTs were elevated as last appointment. Agreeable to rechecking these today. Review of Systems   Constitutional:  Negative for chills and fever. HENT:  Negative for ear discharge, ear pain, hearing loss, sinus pressure, sinus pain and sore throat. Eyes:  Negative for pain, discharge and redness. Respiratory:  Negative for cough, shortness of breath and wheezing. Cardiovascular:  Negative for chest pain and palpitations. Gastrointestinal:  Negative for abdominal distention, abdominal pain, diarrhea, nausea and vomiting. Genitourinary:  Negative for dysuria and hematuria. Musculoskeletal:  Negative for myalgias. Skin:  Negative for rash. Neurological:  Negative for dizziness, weakness, light-headedness, numbness and headaches. Psychiatric/Behavioral:  Negative for decreased concentration, dysphoric mood, self-injury, sleep disturbance and suicidal ideas. The patient is not nervous/anxious. Physical Exam  Vitals reviewed. Constitutional:       General: He is not in acute distress. Appearance: Normal appearance. He is normal weight. HENT:      Head: Normocephalic and atraumatic. Right Ear: Tympanic membrane, ear canal and external ear normal.      Left Ear: Tympanic membrane, ear canal and external ear normal.      Nose: Nose normal.      Mouth/Throat:      Mouth: Mucous membranes are moist.      Pharynx: Oropharynx is clear. No posterior oropharyngeal erythema. Eyes:      General: No scleral icterus. Right eye: No discharge. Left eye: No discharge.       Extraocular Movements: Extraocular movements intact. Pupils: Pupils are equal, round, and reactive to light. Neck:      Vascular: No carotid bruit. Cardiovascular:      Rate and Rhythm: Normal rate and regular rhythm. Pulses: Normal pulses. Heart sounds: Normal heart sounds. No murmur heard. No gallop. Pulmonary:      Effort: Pulmonary effort is normal.      Breath sounds: Normal breath sounds. No wheezing. Abdominal:      General: Bowel sounds are normal.      Palpations: Abdomen is soft. Tenderness: There is no abdominal tenderness. There is no guarding or rebound. Musculoskeletal:         General: Normal range of motion. Cervical back: Normal range of motion and neck supple. Lymphadenopathy:      Cervical: No cervical adenopathy. Skin:     General: Skin is warm and dry. Capillary Refill: Capillary refill takes less than 2 seconds. Neurological:      Mental Status: He is alert and oriented to person, place, and time. Mental status is at baseline. Psychiatric:         Mood and Affect: Mood normal.         Behavior: Behavior normal.         Thought Content: Thought content normal.         Judgment: Judgment normal.             This dictation was generated by voice recognition computer software. Although all attempts are made to edit the dictation for accuracy, there may be errors in the transcription that are not intended. An electronic signature was used to authenticate this note.     --Amira Jj, HOMER - CNP

## 2022-11-26 DIAGNOSIS — I10 ESSENTIAL HYPERTENSION: ICD-10-CM

## 2022-11-28 RX ORDER — LISINOPRIL 10 MG/1
TABLET ORAL
Qty: 30 TABLET | OUTPATIENT
Start: 2022-11-28

## 2022-11-28 NOTE — TELEPHONE ENCOUNTER
Disp Refills Start End    lisinopril (PRINIVIL;ZESTRIL) 10 MG tablet 90 tablet 1 11/16/2022     Sig - Route:  Take 1 tablet by mouth daily - Oral    Sent to pharmacy as: Lisinopril 10 MG Oral Tablet (PRINIVIL;ZESTRIL)    E-Prescribing Status: Receipt confirmed by pharmacy (11/16/2022  7:19 AM EST)    DUPLICATE REQUEST

## 2023-03-28 ENCOUNTER — APPOINTMENT (OUTPATIENT)
Dept: GENERAL RADIOLOGY | Age: 43
End: 2023-03-28
Payer: COMMERCIAL

## 2023-03-28 ENCOUNTER — HOSPITAL ENCOUNTER (EMERGENCY)
Age: 43
Discharge: HOME | End: 2023-03-28
Payer: COMMERCIAL

## 2023-03-28 ENCOUNTER — APPOINTMENT (OUTPATIENT)
Dept: CT IMAGING | Age: 43
End: 2023-03-28
Payer: COMMERCIAL

## 2023-03-28 VITALS
SYSTOLIC BLOOD PRESSURE: 152 MMHG | TEMPERATURE: 97.6 F | WEIGHT: 152 LBS | BODY MASS INDEX: 21.76 KG/M2 | OXYGEN SATURATION: 100 % | RESPIRATION RATE: 12 BRPM | HEART RATE: 76 BPM | HEIGHT: 70 IN | DIASTOLIC BLOOD PRESSURE: 103 MMHG

## 2023-03-28 DIAGNOSIS — K52.9 GASTROENTERITIS: ICD-10-CM

## 2023-03-28 DIAGNOSIS — U07.1 COVID-19: Primary | ICD-10-CM

## 2023-03-28 LAB
ALBUMIN SERPL-MCNC: 4 G/DL (ref 3.4–5)
ALBUMIN/GLOB SERPL: 1.2 {RATIO} (ref 1.1–2.2)
ALP SERPL-CCNC: 121 U/L (ref 40–129)
ALT SERPL-CCNC: 28 U/L (ref 10–40)
ANION GAP SERPL CALCULATED.3IONS-SCNC: 11 MMOL/L (ref 3–16)
AST SERPL-CCNC: 39 U/L (ref 15–37)
BASOPHILS # BLD: 0.1 K/UL (ref 0–0.2)
BASOPHILS NFR BLD: 0.6 %
BILIRUB SERPL-MCNC: 1 MG/DL (ref 0–1)
BILIRUB UR QL STRIP.AUTO: ABNORMAL
BUN SERPL-MCNC: 7 MG/DL (ref 7–20)
CALCIUM SERPL-MCNC: 9.9 MG/DL (ref 8.3–10.6)
CHLORIDE SERPL-SCNC: 96 MMOL/L (ref 99–110)
CLARITY UR: CLEAR
CO2 SERPL-SCNC: 24 MMOL/L (ref 21–32)
COLOR UR: ABNORMAL
CREAT SERPL-MCNC: 0.8 MG/DL (ref 0.9–1.3)
DEPRECATED RDW RBC AUTO: 13.8 % (ref 12.4–15.4)
EOSINOPHIL # BLD: 0 K/UL (ref 0–0.6)
EOSINOPHIL NFR BLD: 0.4 %
EPI CELLS #/AREA URNS HPF: ABNORMAL /HPF (ref 0–5)
GFR SERPLBLD CREATININE-BSD FMLA CKD-EPI: >60 ML/MIN/{1.73_M2}
GLUCOSE SERPL-MCNC: 101 MG/DL (ref 70–99)
GLUCOSE UR STRIP.AUTO-MCNC: ABNORMAL MG/DL
HCT VFR BLD AUTO: 67 % (ref 40.5–52.5)
HGB BLD-MCNC: 22.3 G/DL (ref 13.5–17.5)
HGB UR QL STRIP.AUTO: ABNORMAL
KETONES UR STRIP.AUTO-MCNC: ABNORMAL MG/DL
LEUKOCYTE ESTERASE UR QL STRIP.AUTO: ABNORMAL
LIPASE SERPL-CCNC: 17 U/L (ref 13–60)
LYMPHOCYTES # BLD: 1.5 K/UL (ref 1–5.1)
LYMPHOCYTES NFR BLD: 16.3 %
MCH RBC QN AUTO: 33.8 PG (ref 26–34)
MCHC RBC AUTO-ENTMCNC: 33.3 G/DL (ref 31–36)
MCV RBC AUTO: 101.6 FL (ref 80–100)
MONOCYTES # BLD: 0.6 K/UL (ref 0–1.3)
MONOCYTES NFR BLD: 6.5 %
MUCOUS THREADS #/AREA URNS LPF: ABNORMAL /LPF
NEUTROPHILS # BLD: 6.9 K/UL (ref 1.7–7.7)
NEUTROPHILS NFR BLD: 76.2 %
NITRITE UR QL STRIP.AUTO: ABNORMAL
PATH INTERP BLD-IMP: YES
PH UR STRIP.AUTO: ABNORMAL [PH] (ref 5–8)
PLATELET # BLD AUTO: 143 K/UL (ref 135–450)
PLATELET BLD QL SMEAR: ADEQUATE
PMV BLD AUTO: 8.4 FL (ref 5–10.5)
POTASSIUM SERPL-SCNC: 4.7 MMOL/L (ref 3.5–5.1)
PROT SERPL-MCNC: 7.3 G/DL (ref 6.4–8.2)
PROT UR STRIP.AUTO-MCNC: ABNORMAL MG/DL
RBC # BLD AUTO: 6.6 M/UL (ref 4.2–5.9)
RBC #/AREA URNS HPF: ABNORMAL /HPF (ref 0–4)
SLIDE REVIEW: ABNORMAL
SODIUM SERPL-SCNC: 131 MMOL/L (ref 136–145)
SP GR UR STRIP.AUTO: ABNORMAL (ref 1–1.03)
UA COMPLETE W REFLEX CULTURE PNL UR: ABNORMAL
UA DIPSTICK W REFLEX MICRO PNL UR: YES
URN SPEC COLLECT METH UR: ABNORMAL
UROBILINOGEN UR STRIP-ACNC: ABNORMAL E.U./DL
WBC # BLD AUTO: 9.1 K/UL (ref 4–11)
WBC #/AREA URNS HPF: ABNORMAL /HPF (ref 0–5)

## 2023-03-28 PROCEDURE — 81001 URINALYSIS AUTO W/SCOPE: CPT

## 2023-03-28 PROCEDURE — 99285 EMERGENCY DEPT VISIT HI MDM: CPT

## 2023-03-28 PROCEDURE — 71045 X-RAY EXAM CHEST 1 VIEW: CPT

## 2023-03-28 PROCEDURE — 6360000002 HC RX W HCPCS: Performed by: PHYSICIAN ASSISTANT

## 2023-03-28 PROCEDURE — 85025 COMPLETE CBC W/AUTO DIFF WBC: CPT

## 2023-03-28 PROCEDURE — 96375 TX/PRO/DX INJ NEW DRUG ADDON: CPT

## 2023-03-28 PROCEDURE — 96374 THER/PROPH/DIAG INJ IV PUSH: CPT

## 2023-03-28 PROCEDURE — 83690 ASSAY OF LIPASE: CPT

## 2023-03-28 PROCEDURE — 80053 COMPREHEN METABOLIC PANEL: CPT

## 2023-03-28 PROCEDURE — 74177 CT ABD & PELVIS W/CONTRAST: CPT

## 2023-03-28 PROCEDURE — 6360000004 HC RX CONTRAST MEDICATION: Performed by: PHYSICIAN ASSISTANT

## 2023-03-28 PROCEDURE — 96361 HYDRATE IV INFUSION ADD-ON: CPT

## 2023-03-28 PROCEDURE — 2500000003 HC RX 250 WO HCPCS: Performed by: PHYSICIAN ASSISTANT

## 2023-03-28 PROCEDURE — 2580000003 HC RX 258: Performed by: PHYSICIAN ASSISTANT

## 2023-03-28 PROCEDURE — A4216 STERILE WATER/SALINE, 10 ML: HCPCS | Performed by: PHYSICIAN ASSISTANT

## 2023-03-28 RX ORDER — ONDANSETRON 2 MG/ML
4 INJECTION INTRAMUSCULAR; INTRAVENOUS EVERY 6 HOURS PRN
Status: DISCONTINUED | OUTPATIENT
Start: 2023-03-28 | End: 2023-03-28 | Stop reason: HOSPADM

## 2023-03-28 RX ORDER — 0.9 % SODIUM CHLORIDE 0.9 %
1000 INTRAVENOUS SOLUTION INTRAVENOUS ONCE
Status: COMPLETED | OUTPATIENT
Start: 2023-03-28 | End: 2023-03-28

## 2023-03-28 RX ORDER — ASCORBIC ACID 500 MG
500 TABLET ORAL 2 TIMES DAILY
Qty: 14 TABLET | Refills: 0 | Status: SHIPPED | OUTPATIENT
Start: 2023-03-28 | End: 2023-04-04

## 2023-03-28 RX ORDER — ZINC SULFATE 50(220)MG
50 CAPSULE ORAL DAILY
Qty: 7 CAPSULE | Refills: 0 | Status: SHIPPED | OUTPATIENT
Start: 2023-03-28 | End: 2023-04-04

## 2023-03-28 RX ADMIN — ONDANSETRON 4 MG: 2 INJECTION INTRAMUSCULAR; INTRAVENOUS at 13:15

## 2023-03-28 RX ADMIN — IOPAMIDOL 75 ML: 755 INJECTION, SOLUTION INTRAVENOUS at 13:40

## 2023-03-28 RX ADMIN — FAMOTIDINE 20 MG: 10 INJECTION, SOLUTION INTRAVENOUS at 13:16

## 2023-03-28 RX ADMIN — SODIUM CHLORIDE 1000 ML: 9 INJECTION, SOLUTION INTRAVENOUS at 13:15

## 2023-03-28 ASSESSMENT — ENCOUNTER SYMPTOMS
SORE THROAT: 0
RHINORRHEA: 0
COUGH: 1
NAUSEA: 1
CONSTIPATION: 0
ABDOMINAL PAIN: 1
DIARRHEA: 1
SHORTNESS OF BREATH: 0
EYE PAIN: 0
VOMITING: 1
BACK PAIN: 0

## 2023-03-28 ASSESSMENT — LIFESTYLE VARIABLES
HOW OFTEN DO YOU HAVE A DRINK CONTAINING ALCOHOL: 4 OR MORE TIMES A WEEK
HOW MANY STANDARD DRINKS CONTAINING ALCOHOL DO YOU HAVE ON A TYPICAL DAY: 1 OR 2

## 2023-03-28 ASSESSMENT — PAIN SCALES - GENERAL: PAINLEVEL_OUTOF10: 0

## 2023-03-28 NOTE — ED PROVIDER NOTES
[address] [place], [place] [address]  [phone]    Schedule an appointment as soon as possible for a visit in 3 days  [facility] Emergency Department  [address] [facility]  [phone]    As needed, If symptoms worsen      DISCHARGE MEDICATIONS:  New Prescriptions    ASCORBIC ACID (VITAMIN C) 500 MG TABLET    Take 1 tablet by mouth 2 times daily for 7 days    ZINC SULFATE (ZINCATE) 220 (50 ZN) MG CAPSULE    Take 1 capsule by mouth daily for 7 days       DISCONTINUED MEDICATIONS:  Discontinued Medications    No medications on file              (Please note that portions of this note were completed with a voice recognition program.  Efforts were made to edit the dictations but occasionally words are mis-transcribed.)    TAWNYA Yee (electronically signed)           Elvis Yee  03/28/23 2021

## 2023-03-28 NOTE — Clinical Note
Scott Sotelo was seen and treated in our emergency department on 3/28/2023. He may return to work on 03/31/2023. If you have any questions or concerns, please don't hesitate to call.       TAWNYA Nance

## 2023-03-29 LAB — PATH INTERP BLD-IMP: NORMAL

## 2023-04-20 ENCOUNTER — OFFICE VISIT (OUTPATIENT)
Dept: FAMILY MEDICINE CLINIC | Age: 43
End: 2023-04-20
Payer: COMMERCIAL

## 2023-04-20 VITALS
SYSTOLIC BLOOD PRESSURE: 120 MMHG | WEIGHT: 140 LBS | BODY MASS INDEX: 20.04 KG/M2 | OXYGEN SATURATION: 99 % | HEART RATE: 100 BPM | DIASTOLIC BLOOD PRESSURE: 62 MMHG | HEIGHT: 70 IN

## 2023-04-20 DIAGNOSIS — M54.41 ACUTE RIGHT-SIDED LOW BACK PAIN WITH RIGHT-SIDED SCIATICA: Primary | ICD-10-CM

## 2023-04-20 PROCEDURE — 3074F SYST BP LT 130 MM HG: CPT | Performed by: NURSE PRACTITIONER

## 2023-04-20 PROCEDURE — 3078F DIAST BP <80 MM HG: CPT | Performed by: NURSE PRACTITIONER

## 2023-04-20 PROCEDURE — 99213 OFFICE O/P EST LOW 20 MIN: CPT | Performed by: NURSE PRACTITIONER

## 2023-04-20 RX ORDER — METHYLPREDNISOLONE 4 MG/1
TABLET ORAL
Qty: 1 KIT | Refills: 0 | Status: SHIPPED | OUTPATIENT
Start: 2023-04-20 | End: 2023-04-26

## 2023-04-20 SDOH — ECONOMIC STABILITY: HOUSING INSECURITY
IN THE LAST 12 MONTHS, WAS THERE A TIME WHEN YOU DID NOT HAVE A STEADY PLACE TO SLEEP OR SLEPT IN A SHELTER (INCLUDING NOW)?: NO

## 2023-04-20 SDOH — ECONOMIC STABILITY: INCOME INSECURITY: HOW HARD IS IT FOR YOU TO PAY FOR THE VERY BASICS LIKE FOOD, HOUSING, MEDICAL CARE, AND HEATING?: NOT HARD AT ALL

## 2023-04-20 SDOH — ECONOMIC STABILITY: INCOME INSECURITY: HOW HARD IS IT FOR YOU TO PAY FOR THE VERY BASICS LIKE FOOD, HOUSING, MEDICAL CARE, AND HEATING?: NOT VERY HARD

## 2023-04-20 SDOH — ECONOMIC STABILITY: FOOD INSECURITY: WITHIN THE PAST 12 MONTHS, THE FOOD YOU BOUGHT JUST DIDN'T LAST AND YOU DIDN'T HAVE MONEY TO GET MORE.: NEVER TRUE

## 2023-04-20 SDOH — ECONOMIC STABILITY: FOOD INSECURITY: WITHIN THE PAST 12 MONTHS, YOU WORRIED THAT YOUR FOOD WOULD RUN OUT BEFORE YOU GOT MONEY TO BUY MORE.: NEVER TRUE

## 2023-04-20 ASSESSMENT — ENCOUNTER SYMPTOMS
WHEEZING: 0
BACK PAIN: 1
SHORTNESS OF BREATH: 0

## 2023-04-20 ASSESSMENT — PATIENT HEALTH QUESTIONNAIRE - PHQ9
1. LITTLE INTEREST OR PLEASURE IN DOING THINGS: 1
2. FEELING DOWN, DEPRESSED OR HOPELESS: 1
SUM OF ALL RESPONSES TO PHQ QUESTIONS 1-9: 2
SUM OF ALL RESPONSES TO PHQ9 QUESTIONS 1 & 2: 2

## 2023-04-20 NOTE — PROGRESS NOTES
Psychiatric/Behavioral:  Negative for decreased concentration, dysphoric mood, self-injury, sleep disturbance and suicidal ideas. The patient is not nervous/anxious. Physical Exam  Constitutional:       General: He is not in acute distress. Appearance: Normal appearance. He is normal weight. Cardiovascular:      Pulses: Normal pulses. Heart sounds: Normal heart sounds. No murmur heard. No gallop. Pulmonary:      Effort: Pulmonary effort is normal.      Breath sounds: Normal breath sounds. No wheezing. Musculoskeletal:         General: Tenderness present. Normal range of motion. Comments: Tenderness and tension to the right lower back over the latissimus dorsi. Neurological:      Mental Status: He is alert and oriented to person, place, and time. Psychiatric:         Mood and Affect: Mood normal.         Behavior: Behavior normal.         Thought Content: Thought content normal.         Judgment: Judgment normal.             This dictation was generated by voice recognition computer software. Although all attempts are made to edit the dictation for accuracy, there may be errors in the transcription that are not intended. An electronic signature was used to authenticate this note.     --HOMER Jones - CNP

## 2023-04-28 ENCOUNTER — TELEPHONE (OUTPATIENT)
Dept: FAMILY MEDICINE CLINIC | Age: 43
End: 2023-04-28

## 2023-04-28 DIAGNOSIS — M54.41 ACUTE RIGHT-SIDED LOW BACK PAIN WITH RIGHT-SIDED SCIATICA: ICD-10-CM

## 2023-04-28 RX ORDER — METHYLPREDNISOLONE 4 MG/1
TABLET ORAL
Qty: 1 KIT | Refills: 0 | Status: CANCELLED | OUTPATIENT
Start: 2023-04-28 | End: 2023-05-04

## 2023-04-28 NOTE — TELEPHONE ENCOUNTER
If it started hurting again 2 days later, it will keep doing the same thing. See if he wants to go to PT or be referred to pain management for possible epidural steroid injection.

## 2023-04-28 NOTE — TELEPHONE ENCOUNTER
Patient called and would like to know if niecy could call in another round of steroids as it worked for his lower back.   He said 2 days after it ended it started hurting again       methylPREDNISolone (Julee Runner) 4 MG tablet       [facility] [id number] - [address] [phone]

## 2023-04-28 NOTE — TELEPHONE ENCOUNTER
Pt states he would like to go to pain management, he prefers to go to [facility]

## 2023-05-22 ENCOUNTER — OFFICE VISIT (OUTPATIENT)
Dept: FAMILY MEDICINE CLINIC | Age: 43
End: 2023-05-22
Payer: COMMERCIAL

## 2023-05-22 VITALS
HEIGHT: 70 IN | DIASTOLIC BLOOD PRESSURE: 78 MMHG | OXYGEN SATURATION: 100 % | WEIGHT: 143 LBS | BODY MASS INDEX: 20.47 KG/M2 | HEART RATE: 97 BPM | SYSTOLIC BLOOD PRESSURE: 124 MMHG

## 2023-05-22 DIAGNOSIS — E78.00 PURE HYPERCHOLESTEROLEMIA: ICD-10-CM

## 2023-05-22 DIAGNOSIS — M54.41 ACUTE RIGHT-SIDED LOW BACK PAIN WITH RIGHT-SIDED SCIATICA: Primary | ICD-10-CM

## 2023-05-22 DIAGNOSIS — I10 ESSENTIAL HYPERTENSION: ICD-10-CM

## 2023-05-22 LAB
ALBUMIN SERPL-MCNC: 4.9 G/DL (ref 3.4–5)
ALBUMIN/GLOB SERPL: 2.3 {RATIO} (ref 1.1–2.2)
ALP SERPL-CCNC: 112 U/L (ref 40–129)
ALT SERPL-CCNC: 42 U/L (ref 10–40)
ANION GAP SERPL CALCULATED.3IONS-SCNC: 15 MMOL/L (ref 3–16)
AST SERPL-CCNC: 46 U/L (ref 15–37)
BASOPHILS # BLD: 0 K/UL (ref 0–0.2)
BASOPHILS NFR BLD: 0.4 %
BILIRUB SERPL-MCNC: 0.4 MG/DL (ref 0–1)
BUN SERPL-MCNC: 12 MG/DL (ref 7–20)
CALCIUM SERPL-MCNC: 9.3 MG/DL (ref 8.3–10.6)
CHLORIDE SERPL-SCNC: 104 MMOL/L (ref 99–110)
CHOLEST SERPL-MCNC: 182 MG/DL (ref 0–199)
CO2 SERPL-SCNC: 25 MMOL/L (ref 21–32)
CREAT SERPL-MCNC: 0.8 MG/DL (ref 0.9–1.3)
CRP SERPL-MCNC: <3 MG/L (ref 0–5.1)
DEPRECATED RDW RBC AUTO: 15 % (ref 12.4–15.4)
EOSINOPHIL # BLD: 0.1 K/UL (ref 0–0.6)
EOSINOPHIL NFR BLD: 0.8 %
ERYTHROCYTE [SEDIMENTATION RATE] IN BLOOD BY WESTERGREN METHOD: 6 MM/HR (ref 0–15)
GFR SERPLBLD CREATININE-BSD FMLA CKD-EPI: >60 ML/MIN/{1.73_M2}
GLUCOSE SERPL-MCNC: 81 MG/DL (ref 70–99)
HCT VFR BLD AUTO: 50.9 % (ref 40.5–52.5)
HDLC SERPL-MCNC: 60 MG/DL (ref 40–60)
HGB BLD-MCNC: 17.4 G/DL (ref 13.5–17.5)
LDLC SERPL CALC-MCNC: 106 MG/DL
LYMPHOCYTES # BLD: 1.7 K/UL (ref 1–5.1)
LYMPHOCYTES NFR BLD: 15.5 %
MCH RBC QN AUTO: 34.9 PG (ref 26–34)
MCHC RBC AUTO-ENTMCNC: 34.1 G/DL (ref 31–36)
MCV RBC AUTO: 102.3 FL (ref 80–100)
MONOCYTES # BLD: 0.6 K/UL (ref 0–1.3)
MONOCYTES NFR BLD: 5.3 %
NEUTROPHILS # BLD: 8.7 K/UL (ref 1.7–7.7)
NEUTROPHILS NFR BLD: 78 %
PLATELET # BLD AUTO: 179 K/UL (ref 135–450)
PMV BLD AUTO: 9.3 FL (ref 5–10.5)
POTASSIUM SERPL-SCNC: 4.8 MMOL/L (ref 3.5–5.1)
PROT SERPL-MCNC: 7 G/DL (ref 6.4–8.2)
RBC # BLD AUTO: 4.98 M/UL (ref 4.2–5.9)
SODIUM SERPL-SCNC: 144 MMOL/L (ref 136–145)
TRIGL SERPL-MCNC: 80 MG/DL (ref 0–150)
VLDLC SERPL CALC-MCNC: 16 MG/DL
WBC # BLD AUTO: 11.1 K/UL (ref 4–11)

## 2023-05-22 PROCEDURE — 99214 OFFICE O/P EST MOD 30 MIN: CPT | Performed by: NURSE PRACTITIONER

## 2023-05-22 PROCEDURE — 3074F SYST BP LT 130 MM HG: CPT | Performed by: NURSE PRACTITIONER

## 2023-05-22 PROCEDURE — 36415 COLL VENOUS BLD VENIPUNCTURE: CPT | Performed by: NURSE PRACTITIONER

## 2023-05-22 PROCEDURE — 3078F DIAST BP <80 MM HG: CPT | Performed by: NURSE PRACTITIONER

## 2023-05-22 RX ORDER — CELECOXIB 200 MG/1
200 CAPSULE ORAL 2 TIMES DAILY PRN
Qty: 180 CAPSULE | Refills: 1 | Status: SHIPPED | OUTPATIENT
Start: 2023-05-22

## 2023-05-22 ASSESSMENT — ENCOUNTER SYMPTOMS
SHORTNESS OF BREATH: 0
COUGH: 0
WHEEZING: 0
EYE DISCHARGE: 0
EYE REDNESS: 0
SINUS PRESSURE: 0
DIARRHEA: 0
SORE THROAT: 0
EYE PAIN: 0
ABDOMINAL DISTENTION: 0
ABDOMINAL PAIN: 0
NAUSEA: 0
VOMITING: 0
BACK PAIN: 1
SINUS PAIN: 0

## 2023-05-22 NOTE — PROGRESS NOTES
Saturnino Rios (:  1980) is a 38 y.o. male,Established patient, here for evaluation of the following chief complaint(s):  Hypertension (FOLLOW UP ON HTN )      ASSESSMENT/PLAN:  1. Acute right-sided low back pain with right-sided sciatica  -Uncontrolled with meloxicam.  Discussed options. We will start Celebrex. Educated on medication use as well as side effects. We will also refer to Ortho given that symptoms have now persisted for about a month. He will keep trying to get in with pain management. Could consider referring to a different pain management if they are not scheduling him. We will also check CRP, sed rate, and CBC with fasting labs. As needed. -     celecoxib (CELEBREX) 200 MG capsule; Take 1 capsule by mouth 2 times daily as needed for Pain, Disp-180 capsule, R-1Normal  -     Josephine Rdz MD, Orthopedic Surgery (Spine), [facility] [place]  -     C-Reactive Protein; Future  -     Sedimentation Rate; Future  -     CBC with Auto Differential; Future  2. Essential hypertension  -Controlled on current dose of lisinopril. No changes today. Can follow-up in 6 months, or sooner if needed. Bedside labs today. -     Comprehensive Metabolic Panel; Future  3. Pure hypercholesterolemia  -Due for fasting labs. -     Lipid Panel; Future      No follow-ups on file. SUBJECTIVE/OBJECTIVE:  DONNA  Ashley Arzola presents today for hypertension follow-up. He is also following up for his back. Continues to have back issues. He states that the meloxicam is not really helping. Tizanidine helps, but he cannot take it during the day due to drowsiness. He has been unable to get in with pain management for possible CATE. States that the provider was out of the country and has not yet called back. He is wondering if it would be beneficial just to see an orthopedic given his history of spine surgery. Some days are better than others. Still has pain radiating down his right leg.   Has fusion to his

## 2023-05-24 DIAGNOSIS — R79.89 ELEVATED LFTS: Primary | ICD-10-CM

## 2023-05-24 DIAGNOSIS — R79.89 ABNORMAL CBC: ICD-10-CM

## 2023-06-23 DIAGNOSIS — I10 ESSENTIAL HYPERTENSION: ICD-10-CM

## 2023-06-23 RX ORDER — LISINOPRIL 10 MG/1
TABLET ORAL
Qty: 90 TABLET | Refills: 0 | Status: SHIPPED | OUTPATIENT
Start: 2023-06-23

## 2023-10-12 DIAGNOSIS — I10 ESSENTIAL HYPERTENSION: ICD-10-CM

## 2023-10-12 RX ORDER — LISINOPRIL 10 MG/1
10 TABLET ORAL DAILY
Qty: 90 TABLET | Refills: 0 | Status: SHIPPED | OUTPATIENT
Start: 2023-10-12

## 2023-12-14 ENCOUNTER — OFFICE VISIT (OUTPATIENT)
Dept: FAMILY MEDICINE CLINIC | Age: 43
End: 2023-12-14
Payer: COMMERCIAL

## 2023-12-14 VITALS
DIASTOLIC BLOOD PRESSURE: 70 MMHG | OXYGEN SATURATION: 99 % | HEART RATE: 100 BPM | WEIGHT: 152 LBS | SYSTOLIC BLOOD PRESSURE: 120 MMHG | BODY MASS INDEX: 21.76 KG/M2 | HEIGHT: 70 IN

## 2023-12-14 DIAGNOSIS — R79.89 ABNORMAL CBC: ICD-10-CM

## 2023-12-14 DIAGNOSIS — M54.41 ACUTE RIGHT-SIDED LOW BACK PAIN WITH RIGHT-SIDED SCIATICA: ICD-10-CM

## 2023-12-14 DIAGNOSIS — M51.36 DDD (DEGENERATIVE DISC DISEASE), LUMBAR: ICD-10-CM

## 2023-12-14 DIAGNOSIS — R06.02 SHORTNESS OF BREATH: ICD-10-CM

## 2023-12-14 DIAGNOSIS — E78.00 PURE HYPERCHOLESTEROLEMIA: ICD-10-CM

## 2023-12-14 DIAGNOSIS — R79.89 ELEVATED LFTS: ICD-10-CM

## 2023-12-14 DIAGNOSIS — I10 ESSENTIAL HYPERTENSION: Primary | ICD-10-CM

## 2023-12-14 LAB
ALBUMIN SERPL-MCNC: 4.9 G/DL (ref 3.4–5)
ALP SERPL-CCNC: 122 U/L (ref 40–129)
ALT SERPL-CCNC: 113 U/L (ref 10–40)
AST SERPL-CCNC: 82 U/L (ref 15–37)
BASOPHILS # BLD: 0.1 K/UL (ref 0–0.2)
BASOPHILS NFR BLD: 0.6 %
BILIRUB DIRECT SERPL-MCNC: <0.2 MG/DL (ref 0–0.3)
BILIRUB INDIRECT SERPL-MCNC: ABNORMAL MG/DL (ref 0–1)
BILIRUB SERPL-MCNC: 0.4 MG/DL (ref 0–1)
DEPRECATED RDW RBC AUTO: 13.9 % (ref 12.4–15.4)
EOSINOPHIL # BLD: 0.3 K/UL (ref 0–0.6)
EOSINOPHIL NFR BLD: 3.2 %
HCT VFR BLD AUTO: 50.8 % (ref 40.5–52.5)
HGB BLD-MCNC: 17.9 G/DL (ref 13.5–17.5)
LYMPHOCYTES # BLD: 2.4 K/UL (ref 1–5.1)
LYMPHOCYTES NFR BLD: 23.5 %
MCH RBC QN AUTO: 36.6 PG (ref 26–34)
MCHC RBC AUTO-ENTMCNC: 35.2 G/DL (ref 31–36)
MCV RBC AUTO: 104 FL (ref 80–100)
MONOCYTES # BLD: 0.6 K/UL (ref 0–1.3)
MONOCYTES NFR BLD: 6.2 %
NEUTROPHILS # BLD: 6.8 K/UL (ref 1.7–7.7)
NEUTROPHILS NFR BLD: 66.5 %
PLATELET # BLD AUTO: 180 K/UL (ref 135–450)
PMV BLD AUTO: 8.9 FL (ref 5–10.5)
PROT SERPL-MCNC: 7.4 G/DL (ref 6.4–8.2)
RBC # BLD AUTO: 4.89 M/UL (ref 4.2–5.9)
WBC # BLD AUTO: 10.2 K/UL (ref 4–11)

## 2023-12-14 PROCEDURE — 3078F DIAST BP <80 MM HG: CPT | Performed by: NURSE PRACTITIONER

## 2023-12-14 PROCEDURE — 3074F SYST BP LT 130 MM HG: CPT | Performed by: NURSE PRACTITIONER

## 2023-12-14 PROCEDURE — 36415 COLL VENOUS BLD VENIPUNCTURE: CPT | Performed by: NURSE PRACTITIONER

## 2023-12-14 PROCEDURE — 99214 OFFICE O/P EST MOD 30 MIN: CPT | Performed by: NURSE PRACTITIONER

## 2023-12-14 RX ORDER — FLUTICASONE PROPIONATE AND SALMETEROL 100; 50 UG/1; UG/1
1 POWDER RESPIRATORY (INHALATION) EVERY 12 HOURS
Qty: 60 EACH | Refills: 2 | Status: SHIPPED | OUTPATIENT
Start: 2023-12-14

## 2023-12-14 RX ORDER — ALBUTEROL SULFATE 90 UG/1
2 AEROSOL, METERED RESPIRATORY (INHALATION) 4 TIMES DAILY PRN
Qty: 18 G | Refills: 0 | Status: SHIPPED | OUTPATIENT
Start: 2023-12-14

## 2023-12-14 RX ORDER — LISINOPRIL 10 MG/1
10 TABLET ORAL DAILY
Qty: 90 TABLET | Refills: 1 | Status: SHIPPED | OUTPATIENT
Start: 2023-12-14

## 2023-12-14 RX ORDER — BACLOFEN 5 MG/1
5-10 TABLET ORAL 3 TIMES DAILY
Qty: 90 TABLET | Refills: 1 | Status: SHIPPED | OUTPATIENT
Start: 2023-12-14

## 2023-12-14 RX ORDER — CELECOXIB 200 MG/1
200 CAPSULE ORAL 2 TIMES DAILY PRN
Qty: 180 CAPSULE | Refills: 1 | Status: SHIPPED | OUTPATIENT
Start: 2023-12-14

## 2023-12-14 NOTE — PATIENT INSTRUCTIONS
You may receive a survey regarding the care you received during your visit. Your input is valuable to us. We encourage you to complete and return your survey. We hope you will choose us in the future for your healthcare needs. GENERAL OFFICE POLICIES      Telephone Calls: Messages will be answered within 1-2 business days, unless the provider is out of the office. If it is urgent a covering provider will answer. (this does not include Medication refills). MyChart: We recommend all patients sign up for [email or web address]. Through this portal you can see your lab results, request refills, schedule appointments, pay your bill and send messages to the office. [email or web address] messages will be answered within 1-2 business days unless the provider is out of the office. For urgent matters, please call the office. Appointments:  All appointments must be scheduled. We ask all patients to schedule their next follow up appointment before they leave the office to make sure you will be able to be seen before you run out of medications. 24 hours notice is required to cancel or reschedule an appointment to avoid being marked as a no show. You may be dismissed from the practice after 3 no shows. LATE for Appointment: If you are 15 or more minutes late for your appointment, you may be asked to reschedule. MA/LAB APPTS: Must be scheduled, cannot accept walk in lab visits. We only draw labs for patients established in our office. We only do injections for medications ordered by our office. Acute Sick Visits:  Nothing other than acute complaint will be addressed at this visit. TRADITIONAL MEDICARE  DOES NOT COVER PHYSICALS  MEDICARE WELLNESS VISITS: These are NOT physicals but the free annual visit offered by Medicare to discuss wellness issues. Medication refills, checkups, etc. will not be addressed during this visit.   Medication Refills: Refills are handled electronically so please contact your pharmacy for medication refills

## 2023-12-14 NOTE — PROGRESS NOTES
Labs drawn RA./mv  2 sst  1lav
Abdomen is soft. Tenderness: There is no abdominal tenderness. There is no guarding or rebound. Musculoskeletal:         General: Normal range of motion. Cervical back: Normal range of motion and neck supple. Lymphadenopathy:      Cervical: No cervical adenopathy. Skin:     General: Skin is warm and dry. Capillary Refill: Capillary refill takes less than 2 seconds. Neurological:      Mental Status: He is alert and oriented to person, place, and time. Mental status is at baseline. Psychiatric:         Mood and Affect: Mood normal.         Behavior: Behavior normal.         Thought Content: Thought content normal.         Judgment: Judgment normal.               This dictation was generated by voice recognition computer software. Although all attempts are made to edit the dictation for accuracy, there may be errors in the transcription that are not intended. An electronic signature was used to authenticate this note.     --HOMER Lake - CNP

## 2023-12-15 DIAGNOSIS — R79.89 ELEVATED LFTS: Primary | ICD-10-CM

## 2023-12-15 LAB
FOLATE SERPL-MCNC: 2.37 NG/ML (ref 4.78–24.2)
VIT B12 SERPL-MCNC: 887 PG/ML (ref 211–911)

## 2024-09-10 ENCOUNTER — APPOINTMENT (OUTPATIENT)
Dept: GENERAL RADIOLOGY | Age: 44
End: 2024-09-10

## 2024-09-10 ENCOUNTER — HOSPITAL ENCOUNTER (EMERGENCY)
Age: 44
Discharge: HOME | End: 2024-09-10

## 2024-09-10 VITALS
RESPIRATION RATE: 16 BRPM | SYSTOLIC BLOOD PRESSURE: 171 MMHG | HEIGHT: 70 IN | WEIGHT: 147.2 LBS | DIASTOLIC BLOOD PRESSURE: 111 MMHG | TEMPERATURE: 97.9 F | HEART RATE: 87 BPM | BODY MASS INDEX: 21.07 KG/M2 | OXYGEN SATURATION: 100 %

## 2024-09-10 DIAGNOSIS — M54.41 ACUTE RIGHT-SIDED LOW BACK PAIN WITH RIGHT-SIDED SCIATICA: Primary | ICD-10-CM

## 2024-09-10 PROCEDURE — 6360000002 HC RX W HCPCS: Performed by: PHYSICIAN ASSISTANT

## 2024-09-10 PROCEDURE — 6370000000 HC RX 637 (ALT 250 FOR IP): Performed by: PHYSICIAN ASSISTANT

## 2024-09-10 PROCEDURE — 96372 THER/PROPH/DIAG INJ SC/IM: CPT

## 2024-09-10 PROCEDURE — 72110 X-RAY EXAM L-2 SPINE 4/>VWS: CPT

## 2024-09-10 PROCEDURE — 99284 EMERGENCY DEPT VISIT MOD MDM: CPT

## 2024-09-10 RX ORDER — KETOROLAC TROMETHAMINE 30 MG/ML
30 INJECTION, SOLUTION INTRAMUSCULAR; INTRAVENOUS ONCE
Status: COMPLETED | OUTPATIENT
Start: 2024-09-10 | End: 2024-09-10

## 2024-09-10 RX ORDER — PREDNISONE 20 MG/1
60 TABLET ORAL ONCE
Status: COMPLETED | OUTPATIENT
Start: 2024-09-10 | End: 2024-09-10

## 2024-09-10 RX ORDER — CYCLOBENZAPRINE HCL 10 MG
10 TABLET ORAL 3 TIMES DAILY PRN
Qty: 20 TABLET | Refills: 0 | Status: SHIPPED | OUTPATIENT
Start: 2024-09-10 | End: 2024-09-20

## 2024-09-10 RX ORDER — PREDNISONE 10 MG/1
TABLET ORAL
Qty: 30 TABLET | Refills: 0 | Status: SHIPPED | OUTPATIENT
Start: 2024-09-10 | End: 2024-09-20

## 2024-09-10 RX ORDER — LIDOCAINE 4 G/G
1 PATCH TOPICAL ONCE
Status: DISCONTINUED | OUTPATIENT
Start: 2024-09-10 | End: 2024-09-10 | Stop reason: HOSPADM

## 2024-09-10 RX ORDER — NAPROXEN 500 MG/1
500 TABLET ORAL 2 TIMES DAILY
Qty: 20 TABLET | Refills: 0 | Status: SHIPPED | OUTPATIENT
Start: 2024-09-10 | End: 2024-09-20

## 2024-09-10 RX ORDER — LIDOCAINE 50 MG/G
1 PATCH TOPICAL DAILY
Qty: 30 PATCH | Refills: 0 | Status: SHIPPED | OUTPATIENT
Start: 2024-09-10

## 2024-09-10 RX ORDER — ACETAMINOPHEN 500 MG
1000 TABLET ORAL ONCE
Status: COMPLETED | OUTPATIENT
Start: 2024-09-10 | End: 2024-09-10

## 2024-09-10 RX ADMIN — ACETAMINOPHEN 1000 MG: 500 TABLET ORAL at 12:56

## 2024-09-10 RX ADMIN — PREDNISONE 60 MG: 20 TABLET ORAL at 12:55

## 2024-09-10 RX ADMIN — KETOROLAC TROMETHAMINE 30 MG: 30 INJECTION, SOLUTION INTRAMUSCULAR at 12:51

## 2024-09-10 ASSESSMENT — PAIN DESCRIPTION - FREQUENCY: FREQUENCY: CONTINUOUS

## 2024-09-10 ASSESSMENT — PAIN SCALES - GENERAL
PAINLEVEL_OUTOF10: 6
PAINLEVEL_OUTOF10: 6

## 2024-09-10 ASSESSMENT — PAIN - FUNCTIONAL ASSESSMENT
PAIN_FUNCTIONAL_ASSESSMENT: 0-10
PAIN_FUNCTIONAL_ASSESSMENT: ACTIVITIES ARE NOT PREVENTED

## 2024-09-10 ASSESSMENT — PAIN DESCRIPTION - ONSET: ONSET: ON-GOING

## 2024-09-10 ASSESSMENT — PAIN DESCRIPTION - DESCRIPTORS: DESCRIPTORS: ACHING;SHOOTING

## 2024-09-10 ASSESSMENT — PAIN DESCRIPTION - LOCATION
LOCATION: BACK
LOCATION: BACK;COCCYX

## 2024-09-10 ASSESSMENT — PAIN DESCRIPTION - ORIENTATION: ORIENTATION: RIGHT

## 2024-09-10 ASSESSMENT — PAIN DESCRIPTION - PAIN TYPE: TYPE: ACUTE PAIN

## 2025-04-01 ENCOUNTER — OFFICE VISIT (OUTPATIENT)
Dept: FAMILY MEDICINE CLINIC | Age: 45
End: 2025-04-01

## 2025-04-01 VITALS
SYSTOLIC BLOOD PRESSURE: 140 MMHG | HEIGHT: 70 IN | HEART RATE: 93 BPM | DIASTOLIC BLOOD PRESSURE: 88 MMHG | WEIGHT: 152 LBS | OXYGEN SATURATION: 99 % | BODY MASS INDEX: 21.76 KG/M2

## 2025-04-01 DIAGNOSIS — Z72.0 CURRENT TOBACCO USE: ICD-10-CM

## 2025-04-01 DIAGNOSIS — I16.0 HYPERTENSIVE URGENCY: Primary | ICD-10-CM

## 2025-04-01 LAB
ALBUMIN SERPL-MCNC: 4.5 G/DL (ref 3.4–5)
ALBUMIN/GLOB SERPL: 1.7 {RATIO} (ref 1.1–2.2)
ALP SERPL-CCNC: 158 U/L (ref 40–129)
ALT SERPL-CCNC: 188 U/L (ref 10–40)
ANION GAP SERPL CALCULATED.3IONS-SCNC: 13 MMOL/L (ref 3–16)
AST SERPL-CCNC: 179 U/L (ref 15–37)
BILIRUB SERPL-MCNC: 0.9 MG/DL (ref 0–1)
BUN SERPL-MCNC: 9 MG/DL (ref 7–20)
CALCIUM SERPL-MCNC: 9.3 MG/DL (ref 8.3–10.6)
CHLORIDE SERPL-SCNC: 101 MMOL/L (ref 99–110)
CHOLEST SERPL-MCNC: 173 MG/DL (ref 0–199)
CO2 SERPL-SCNC: 22 MMOL/L (ref 21–32)
CREAT SERPL-MCNC: 0.8 MG/DL (ref 0.9–1.3)
GFR SERPLBLD CREATININE-BSD FMLA CKD-EPI: >90 ML/MIN/{1.73_M2}
GLUCOSE SERPL-MCNC: 70 MG/DL (ref 70–99)
HDLC SERPL-MCNC: 52 MG/DL (ref 40–60)
LDLC SERPL CALC-MCNC: 99 MG/DL
POTASSIUM SERPL-SCNC: 3.9 MMOL/L (ref 3.5–5.1)
PROT SERPL-MCNC: 7.1 G/DL (ref 6.4–8.2)
SODIUM SERPL-SCNC: 136 MMOL/L (ref 136–145)
TRIGL SERPL-MCNC: 111 MG/DL (ref 0–150)
TSH SERPL DL<=0.005 MIU/L-ACNC: 1.49 UIU/ML (ref 0.27–4.2)
VLDLC SERPL CALC-MCNC: 22 MG/DL

## 2025-04-01 PROCEDURE — 3079F DIAST BP 80-89 MM HG: CPT

## 2025-04-01 PROCEDURE — 36415 COLL VENOUS BLD VENIPUNCTURE: CPT

## 2025-04-01 PROCEDURE — 93000 ELECTROCARDIOGRAM COMPLETE: CPT

## 2025-04-01 PROCEDURE — 99214 OFFICE O/P EST MOD 30 MIN: CPT

## 2025-04-01 PROCEDURE — 3077F SYST BP >= 140 MM HG: CPT

## 2025-04-01 RX ORDER — LISINOPRIL 20 MG/1
20 TABLET ORAL DAILY
Qty: 30 TABLET | Refills: 0 | Status: SHIPPED | OUTPATIENT
Start: 2025-04-01

## 2025-04-01 RX ORDER — VARENICLINE TARTRATE 0.5 MG/1
.5-1 TABLET, FILM COATED ORAL SEE ADMIN INSTRUCTIONS
Qty: 57 TABLET | Refills: 0 | Status: SHIPPED | OUTPATIENT
Start: 2025-04-01

## 2025-04-01 SDOH — ECONOMIC STABILITY: INCOME INSECURITY: IN THE LAST 12 MONTHS, WAS THERE A TIME WHEN YOU WERE NOT ABLE TO PAY THE MORTGAGE OR RENT ON TIME?: YES

## 2025-04-01 SDOH — ECONOMIC STABILITY: FOOD INSECURITY: WITHIN THE PAST 12 MONTHS, YOU WORRIED THAT YOUR FOOD WOULD RUN OUT BEFORE YOU GOT MONEY TO BUY MORE.: NEVER TRUE

## 2025-04-01 SDOH — ECONOMIC STABILITY: FOOD INSECURITY: WITHIN THE PAST 12 MONTHS, THE FOOD YOU BOUGHT JUST DIDN'T LAST AND YOU DIDN'T HAVE MONEY TO GET MORE.: NEVER TRUE

## 2025-04-01 ASSESSMENT — PATIENT HEALTH QUESTIONNAIRE - PHQ9
2. FEELING DOWN, DEPRESSED OR HOPELESS: NOT AT ALL
SUM OF ALL RESPONSES TO PHQ QUESTIONS 1-9: 0
1. LITTLE INTEREST OR PLEASURE IN DOING THINGS: NOT AT ALL
SUM OF ALL RESPONSES TO PHQ QUESTIONS 1-9: 0
2. FEELING DOWN, DEPRESSED OR HOPELESS: NOT AT ALL
SUM OF ALL RESPONSES TO PHQ9 QUESTIONS 1 & 2: 0
1. LITTLE INTEREST OR PLEASURE IN DOING THINGS: NOT AT ALL
SUM OF ALL RESPONSES TO PHQ QUESTIONS 1-9: 0
SUM OF ALL RESPONSES TO PHQ QUESTIONS 1-9: 0

## 2025-04-01 NOTE — ASSESSMENT & PLAN NOTE
Chronic, not at goal (unstable), Concerning for uncontrolled BP with urgency at home. Possible HTN emergency per reporting symptoms but would like to avoid the ER visit as much as possible. Start Lisinopril 20 mg daily. Check CBC, CMP, TSH with reflex. Follow-up in 2 weeks.

## 2025-04-01 NOTE — ASSESSMENT & PLAN NOTE
Chronic, not at goal (unstable), Counseled for 6 minutes during the encounter on importance of tobacco cessation especially in setting of uncontrolled HTN and associated symptoms. Agreeable to start Chantix starter pack, which was prescribed.

## 2025-04-01 NOTE — PROGRESS NOTES
Chief Complaint   Patient presents with    Hypertension     HYPERTENSION X 1 DAY   HEADACHE AND BLURRED VISION          ASSESSMENT/PLAN    Problem List           Diagnosed       Circulatory    Hypertensive urgency - Primary      Chronic, not at goal (unstable), Concerning for uncontrolled BP with urgency at home. Possible HTN emergency per reporting symptoms but would like to avoid the ER visit as much as possible. Start Lisinopril 20 mg daily. Check CBC, CMP, TSH with reflex. Follow-up in 2 weeks.         Relevant Medications    lisinopril (PRINIVIL;ZESTRIL) 20 MG tablet    varenicline (CHANTIX) 0.5 MG tablet    Other Relevant Orders    EKG 12 Lead (Completed)    LIPID PANEL    CBC with Auto Differential    Comprehensive Metabolic Panel    TSH reflex to FT4       Other    Current tobacco use      Chronic, not at goal (unstable), Counseled for 6 minutes during the encounter on importance of tobacco cessation especially in setting of uncontrolled HTN and associated symptoms. Agreeable to start Chantix starter pack, which was prescribed.         Relevant Medications    varenicline (CHANTIX) 0.5 MG tablet         Patient was recommended to follow up with annual well health exam.    Return in about 2 weeks (around 4/15/2025) for HTN urgency follow-up.    Subjective   Johnny Flores is a 44 y.o. male being seen in clinic for Uncontrolled HTN. Used to be on Lisinopril 10 mg daily but has not been on it for over 1 year due to not being seen by the provider. Today, patient's BP was 150/90 initially (140/88 on repeat), uncontrolled; stated that he has been using his spouses Amlodipine 10 mg daily for past couple days. Does report episodes of headache and blurred vision during the course of illness. At home he would report having Bps at 170s/110s, concerning for possible hypertensive urgency with emergency symptoms. Intermittent shortness of breath reported.    Review of Systems  Per HPI    Social History     Tobacco Use

## 2025-04-02 ENCOUNTER — RESULTS FOLLOW-UP (OUTPATIENT)
Dept: FAMILY MEDICINE CLINIC | Age: 45
End: 2025-04-02

## 2025-04-02 LAB
BASOPHILS # BLD: 0 K/UL (ref 0–0.2)
BASOPHILS NFR BLD: 0 %
DEPRECATED RDW RBC AUTO: 13.7 % (ref 12.4–15.4)
EOSINOPHIL # BLD: 0.1 K/UL (ref 0–0.6)
EOSINOPHIL NFR BLD: 1 %
HCT VFR BLD AUTO: 50.6 % (ref 40.5–52.5)
HGB BLD-MCNC: 17.7 G/DL (ref 13.5–17.5)
LYMPHOCYTES # BLD: 2.3 K/UL (ref 1–5.1)
LYMPHOCYTES NFR BLD: 31 %
MACROCYTES BLD QL SMEAR: ABNORMAL
MCH RBC QN AUTO: 37.5 PG (ref 26–34)
MCHC RBC AUTO-ENTMCNC: 35 G/DL (ref 31–36)
MCV RBC AUTO: 107.1 FL (ref 80–100)
MONOCYTES # BLD: 0.6 K/UL (ref 0–1.3)
MONOCYTES NFR BLD: 8 %
NEUTROPHILS # BLD: 4.5 K/UL (ref 1.7–7.7)
NEUTROPHILS NFR BLD: 60 %
PLATELET # BLD AUTO: 151 K/UL (ref 135–450)
PMV BLD AUTO: 9.8 FL (ref 5–10.5)
RBC # BLD AUTO: 4.72 M/UL (ref 4.2–5.9)
WBC # BLD AUTO: 7.5 K/UL (ref 4–11)

## 2025-04-15 ENCOUNTER — OFFICE VISIT (OUTPATIENT)
Dept: FAMILY MEDICINE CLINIC | Age: 45
End: 2025-04-15

## 2025-04-15 VITALS
OXYGEN SATURATION: 98 % | SYSTOLIC BLOOD PRESSURE: 140 MMHG | HEART RATE: 85 BPM | DIASTOLIC BLOOD PRESSURE: 100 MMHG | HEIGHT: 70 IN | BODY MASS INDEX: 21.9 KG/M2 | WEIGHT: 153 LBS

## 2025-04-15 DIAGNOSIS — Z72.0 CURRENT TOBACCO USE: ICD-10-CM

## 2025-04-15 DIAGNOSIS — I1A.0 RESISTANT HYPERTENSION: Primary | ICD-10-CM

## 2025-04-15 LAB
ALBUMIN SERPL-MCNC: 4.4 G/DL (ref 3.4–5)
ALBUMIN/GLOB SERPL: 1.8 {RATIO} (ref 1.1–2.2)
ALP SERPL-CCNC: 136 U/L (ref 40–129)
ALT SERPL-CCNC: 104 U/L (ref 10–40)
ANION GAP SERPL CALCULATED.3IONS-SCNC: 11 MMOL/L (ref 3–16)
AST SERPL-CCNC: 123 U/L (ref 15–37)
BILIRUB SERPL-MCNC: 1 MG/DL (ref 0–1)
BUN SERPL-MCNC: 9 MG/DL (ref 7–20)
CALCIUM SERPL-MCNC: 9.6 MG/DL (ref 8.3–10.6)
CHLORIDE SERPL-SCNC: 99 MMOL/L (ref 99–110)
CO2 SERPL-SCNC: 26 MMOL/L (ref 21–32)
CREAT SERPL-MCNC: 0.8 MG/DL (ref 0.9–1.3)
GFR SERPLBLD CREATININE-BSD FMLA CKD-EPI: >90 ML/MIN/{1.73_M2}
GLUCOSE SERPL-MCNC: 88 MG/DL (ref 70–99)
POTASSIUM SERPL-SCNC: 3.7 MMOL/L (ref 3.5–5.1)
PROT SERPL-MCNC: 6.9 G/DL (ref 6.4–8.2)
SODIUM SERPL-SCNC: 136 MMOL/L (ref 136–145)

## 2025-04-15 PROCEDURE — 99213 OFFICE O/P EST LOW 20 MIN: CPT

## 2025-04-15 PROCEDURE — 36415 COLL VENOUS BLD VENIPUNCTURE: CPT

## 2025-04-15 PROCEDURE — 3080F DIAST BP >= 90 MM HG: CPT

## 2025-04-15 PROCEDURE — 3077F SYST BP >= 140 MM HG: CPT

## 2025-04-15 RX ORDER — LISINOPRIL AND HYDROCHLOROTHIAZIDE 20; 25 MG/1; MG/1
1 TABLET ORAL DAILY
Qty: 30 TABLET | Refills: 0 | Status: SHIPPED | OUTPATIENT
Start: 2025-04-15

## 2025-04-15 NOTE — PROGRESS NOTES
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 Add HCTZ 25 mg daily as a part of the Lisinopril-HCTZ combo pill therapy. Obtain renal artery ultrasound to rule out renal artery stenosis. Check CMP today. Follow-up in 1 month.         Relevant Medications    lisinopril-hydroCHLOROthiazide (PRINZIDE;ZESTORETIC) 20-25 MG per tablet    Other Relevant Orders    Comprehensive Metabolic Panel    Vascular duplex renal arterial complete       Other    Current tobacco use      Chronic, not at goal (unstable), Will start Chantix therapy once patient starts his insurance coverage.         Relevant Medications    varenicline (CHANTIX) 0.5 MG tablet         Patient was recommended to follow up with annual well health exam.    Return in about 4 weeks (around 5/13/2025) for HTN follow-up.    Subjective   Johnny Flores is a 44 y.o. male being seen in clinic for HTN follow-up. No significant change in BP despite the Lisinopril 20 mg daily therapy. Patient's BP today was 140/100, unchanged. Per patient report, his BP measurements at home have been running about the same despite the medical therapy. Some shortness of breath and blurry visions, no significant improvement since the last visit. Denied chest pain or abdominal pain.    Was not able to  Chantix therapy due to the coverage; pending insurance coverage in about 1 month. Will likely  Chantix and start therapy once insurance is in effect.    Review of Systems  Per HPI    Social History     Tobacco Use    Smoking status: Every Day     Current packs/day: 1.00     Average packs/day: 1 pack/day for 15.0 years (15.0 ttl pk-yrs)     Types: Cigarettes    Smokeless tobacco: Never

## 2025-04-15 NOTE — ASSESSMENT & PLAN NOTE
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 Add HCTZ 25 mg daily as a part of the Lisinopril-HCTZ combo pill therapy. Obtain renal artery ultrasound to rule out renal artery stenosis. Check CMP today. Follow-up in 1 month.

## 2025-04-15 NOTE — ASSESSMENT & PLAN NOTE
Chronic, not at goal (unstable), Will start Chantix therapy once patient starts his insurance coverage.   ,

## 2025-04-16 ENCOUNTER — RESULTS FOLLOW-UP (OUTPATIENT)
Dept: FAMILY MEDICINE CLINIC | Age: 45
End: 2025-04-16

## 2025-04-16 DIAGNOSIS — I70.1 BILATERAL RENAL ARTERY STENOSIS: ICD-10-CM

## 2025-04-21 ENCOUNTER — APPOINTMENT (OUTPATIENT)
Dept: CT IMAGING | Age: 45
End: 2025-04-21

## 2025-04-21 ENCOUNTER — HOSPITAL ENCOUNTER (EMERGENCY)
Age: 45
Discharge: HOME | End: 2025-04-21
Attending: EMERGENCY MEDICINE

## 2025-04-21 VITALS
DIASTOLIC BLOOD PRESSURE: 84 MMHG | WEIGHT: 153 LBS | OXYGEN SATURATION: 100 % | BODY MASS INDEX: 21.9 KG/M2 | HEIGHT: 70 IN | SYSTOLIC BLOOD PRESSURE: 135 MMHG | TEMPERATURE: 98 F | RESPIRATION RATE: 8 BRPM | HEART RATE: 87 BPM

## 2025-04-21 DIAGNOSIS — F17.200 TOBACCO USE DISORDER: ICD-10-CM

## 2025-04-21 DIAGNOSIS — R42 DIZZINESS: Primary | ICD-10-CM

## 2025-04-21 DIAGNOSIS — E87.8 ELECTROLYTE ABNORMALITY: ICD-10-CM

## 2025-04-21 DIAGNOSIS — F10.90 ALCOHOL USE DISORDER: ICD-10-CM

## 2025-04-21 DIAGNOSIS — D75.89 MACROCYTOSIS: ICD-10-CM

## 2025-04-21 LAB
ALBUMIN SERPL-MCNC: 4.1 G/DL (ref 3.4–5)
ALBUMIN/GLOB SERPL: 1.5 {RATIO} (ref 1.1–2.2)
ALP SERPL-CCNC: 140 U/L (ref 40–129)
ALT SERPL-CCNC: 114 U/L (ref 10–40)
ANION GAP SERPL CALCULATED.3IONS-SCNC: 15 MMOL/L (ref 3–16)
AST SERPL-CCNC: 144 U/L (ref 15–37)
BASOPHILS # BLD: 0 K/UL (ref 0–0.2)
BASOPHILS NFR BLD: 0.3 %
BILIRUB SERPL-MCNC: 1.4 MG/DL (ref 0–1)
BUN SERPL-MCNC: 11 MG/DL (ref 7–20)
CALCIUM SERPL-MCNC: 9.6 MG/DL (ref 8.3–10.6)
CHLORIDE SERPL-SCNC: 93 MMOL/L (ref 99–110)
CO2 SERPL-SCNC: 22 MMOL/L (ref 21–32)
CREAT SERPL-MCNC: 0.9 MG/DL (ref 0.9–1.3)
DEPRECATED RDW RBC AUTO: 14.4 % (ref 12.4–15.4)
EOSINOPHIL # BLD: 0.1 K/UL (ref 0–0.6)
EOSINOPHIL NFR BLD: 0.5 %
GFR SERPLBLD CREATININE-BSD FMLA CKD-EPI: >90 ML/MIN/{1.73_M2}
GLUCOSE SERPL-MCNC: 86 MG/DL (ref 70–99)
HCT VFR BLD AUTO: 53.7 % (ref 40.5–52.5)
HGB BLD-MCNC: 18.4 G/DL (ref 13.5–17.5)
LYMPHOCYTES # BLD: 2.2 K/UL (ref 1–5.1)
LYMPHOCYTES NFR BLD: 14.9 %
MAGNESIUM SERPL-MCNC: 1.55 MG/DL (ref 1.8–2.4)
MCH RBC QN AUTO: 36.6 PG (ref 26–34)
MCHC RBC AUTO-ENTMCNC: 34.2 G/DL (ref 31–36)
MCV RBC AUTO: 107 FL (ref 80–100)
MONOCYTES # BLD: 1 K/UL (ref 0–1.3)
MONOCYTES NFR BLD: 7.1 %
NEUTROPHILS # BLD: 11.3 K/UL (ref 1.7–7.7)
NEUTROPHILS NFR BLD: 77.2 %
PHOSPHATE SERPL-MCNC: 2.4 MG/DL (ref 2.5–4.9)
PLATELET # BLD AUTO: 193 K/UL (ref 135–450)
PMV BLD AUTO: 8.4 FL (ref 5–10.5)
POTASSIUM SERPL-SCNC: 3.8 MMOL/L (ref 3.5–5.1)
PROT SERPL-MCNC: 6.9 G/DL (ref 6.4–8.2)
RBC # BLD AUTO: 5.02 M/UL (ref 4.2–5.9)
SODIUM SERPL-SCNC: 130 MMOL/L (ref 136–145)
WBC # BLD AUTO: 14.7 K/UL (ref 4–11)

## 2025-04-21 PROCEDURE — 99284 EMERGENCY DEPT VISIT MOD MDM: CPT

## 2025-04-21 PROCEDURE — 83735 ASSAY OF MAGNESIUM: CPT

## 2025-04-21 PROCEDURE — 84100 ASSAY OF PHOSPHORUS: CPT

## 2025-04-21 PROCEDURE — 80053 COMPREHEN METABOLIC PANEL: CPT

## 2025-04-21 PROCEDURE — 6360000002 HC RX W HCPCS: Performed by: EMERGENCY MEDICINE

## 2025-04-21 PROCEDURE — 2580000003 HC RX 258: Performed by: EMERGENCY MEDICINE

## 2025-04-21 PROCEDURE — 6370000000 HC RX 637 (ALT 250 FOR IP): Performed by: EMERGENCY MEDICINE

## 2025-04-21 PROCEDURE — 70450 CT HEAD/BRAIN W/O DYE: CPT

## 2025-04-21 PROCEDURE — 85025 COMPLETE CBC W/AUTO DIFF WBC: CPT

## 2025-04-21 PROCEDURE — 93005 ELECTROCARDIOGRAM TRACING: CPT | Performed by: EMERGENCY MEDICINE

## 2025-04-21 PROCEDURE — 96365 THER/PROPH/DIAG IV INF INIT: CPT

## 2025-04-21 RX ORDER — LANOLIN ALCOHOL/MO/W.PET/CERES
800 CREAM (GRAM) TOPICAL ONCE
Status: COMPLETED | OUTPATIENT
Start: 2025-04-21 | End: 2025-04-21

## 2025-04-21 RX ORDER — 0.9 % SODIUM CHLORIDE 0.9 %
1000 INTRAVENOUS SOLUTION INTRAVENOUS ONCE
Status: COMPLETED | OUTPATIENT
Start: 2025-04-21 | End: 2025-04-21

## 2025-04-21 RX ORDER — BUPROPION HYDROCHLORIDE 150 MG/1
150 TABLET ORAL EVERY MORNING
Qty: 30 TABLET | Refills: 1 | Status: SHIPPED | OUTPATIENT
Start: 2025-04-21 | End: 2025-05-21

## 2025-04-21 RX ORDER — M-VIT,TX,IRON,MINS/CALC/FOLIC 27MG-0.4MG
1 TABLET ORAL ONCE
Status: COMPLETED | OUTPATIENT
Start: 2025-04-21 | End: 2025-04-21

## 2025-04-21 RX ORDER — THIAMINE HYDROCHLORIDE 100 MG/ML
500 INJECTION, SOLUTION INTRAMUSCULAR; INTRAVENOUS ONCE
Status: DISCONTINUED | OUTPATIENT
Start: 2025-04-21 | End: 2025-04-21 | Stop reason: SDUPTHER

## 2025-04-21 RX ORDER — FOLIC ACID 1 MG/1
1 TABLET ORAL DAILY
Status: DISCONTINUED | OUTPATIENT
Start: 2025-04-21 | End: 2025-04-21 | Stop reason: HOSPADM

## 2025-04-21 RX ORDER — UBIDECARENONE 75 MG
100 CAPSULE ORAL ONCE
Status: COMPLETED | OUTPATIENT
Start: 2025-04-21 | End: 2025-04-21

## 2025-04-21 RX ORDER — LANOLIN ALCOHOL/MO/W.PET/CERES
500 CREAM (GRAM) TOPICAL DAILY
Qty: 15 TABLET | Refills: 0 | Status: SHIPPED | OUTPATIENT
Start: 2025-04-21 | End: 2025-04-24

## 2025-04-21 RX ORDER — M-VIT,TX,IRON,MINS/CALC/FOLIC 27MG-0.4MG
1 TABLET ORAL DAILY
Qty: 30 TABLET | Refills: 0 | Status: SHIPPED | OUTPATIENT
Start: 2025-04-21 | End: 2025-05-21

## 2025-04-21 RX ADMIN — THIAMINE HYDROCHLORIDE 500 MG: 100 INJECTION, SOLUTION INTRAMUSCULAR; INTRAVENOUS at 17:55

## 2025-04-21 RX ADMIN — FOLIC ACID 1 MG: 1 TABLET ORAL at 17:38

## 2025-04-21 RX ADMIN — Medication 800 MG: at 17:38

## 2025-04-21 RX ADMIN — Medication 1 TABLET: at 17:38

## 2025-04-21 RX ADMIN — VITAM B12 100 MCG: 100 TAB at 17:38

## 2025-04-21 RX ADMIN — Medication 1000 MG: at 18:40

## 2025-04-21 RX ADMIN — SODIUM CHLORIDE 1000 ML: 0.9 INJECTION, SOLUTION INTRAVENOUS at 17:54

## 2025-04-21 ASSESSMENT — PAIN - FUNCTIONAL ASSESSMENT: PAIN_FUNCTIONAL_ASSESSMENT: NONE - DENIES PAIN

## 2025-04-21 ASSESSMENT — ENCOUNTER SYMPTOMS: NAUSEA: 1

## 2025-04-21 NOTE — ED NOTES
Jaw pain off and on today. Patient states he usually drinks alcohol daily, 3 beers and 2 shots. Trying to quit drinking alcohol and caffeine.

## 2025-04-21 NOTE — DISCHARGE INSTRUCTIONS
Your prescription has been sent to [facility].    Be sure to contact your primary care provider's office to arrange for a follow up visit.    If you have any new or worsening issues after going home don't hesitate to return here for reevaluation at any time 24/7!

## 2025-04-22 ENCOUNTER — TELEPHONE (OUTPATIENT)
Dept: FAMILY MEDICINE CLINIC | Age: 45
End: 2025-04-22

## 2025-04-22 LAB
EKG ATRIAL RATE: 119 BPM
EKG DIAGNOSIS: NORMAL
EKG P AXIS: 81 DEGREES
EKG P-R INTERVAL: 132 MS
EKG Q-T INTERVAL: 320 MS
EKG QRS DURATION: 76 MS
EKG QTC CALCULATION (BAZETT): 450 MS
EKG R AXIS: 107 DEGREES
EKG T AXIS: 68 DEGREES
EKG VENTRICULAR RATE: 119 BPM

## 2025-04-22 PROCEDURE — 93010 ELECTROCARDIOGRAM REPORT: CPT | Performed by: INTERNAL MEDICINE

## 2025-04-22 NOTE — TELEPHONE ENCOUNTER
----- Message from Jackie TYLER sent at 4/22/2025  2:29 PM EDT -----  Regarding: [facility] Appointment Request  [facility] Appointment Request    Patient needs appointment for [facility] Appointment Type: ED Follow-Up.    Patient Requested Dates(s): as soon as possible within this week   Patient Requested Time: latest appointment time   Provider Name:Parmjit English MD    Note: Patient went to ER yesterday due to dizziness, lightheadedness and locking of his jaw and he was told to make a follow up appointment with his PCP and he preferred it with Dr. Parmjit English.     Reason for Appointment Request: Established Patient - Available appointments did not meet patient need  --------------------------------------------------------------------------------------------------------------------------    Relationship to Patient: Self     Call Back Information: OK to leave message on voicemail  Preferred Call Back Number: Phone  [phone]

## 2025-04-22 NOTE — ED PROVIDER NOTES
[facility] EMERGENCY DEPARTMENT    Name: Johnny Flores : 1980 MRN: [id number] Date of Service: 2025    Initial VS: BP: 136/77, Temp: 98 °F (36.7 °C), Pulse: (!) 120, Respirations: 18, SpO2: 98 %     CC: dizziness    HPI: this patient is a 44 y.o. male presenting to the ED from home. Mr. Flores tells me that for about the last month - or perhaps even a little longer than that - he has felt persistently dizzy. He feels that his balance has been impaired when walking. He has also noticed sporadic, vague, fleeting changes in his vision. He notes that for weeks his appetite has been poor and he has been nauseous intermittently. He consumes alcohol on an almost everyday basis. The quantity of alcohol that he consumes seems to vary substantially; it sounds like most commonly he consumes 6-8 drinks every evening. On days that he does not consume alcohol he feels restless and has difficulty sleeping. He was concerned by his dizziness and that it did not seem to be improving over time so he came here to be evaluated this afternoon. He has not appreciated other changes from his usual state of health and he denies other current complaints.  _____________________________________________________________________    Past Medical History:   Diagnosis Date    Alcohol use disorder     Chronic liver disease     Hypertension     Opioid use disorder in remission     Tobacco use disorder       Past Surgical History:   Procedure Laterality Date    ANKLE SURGERY Right 10/03/2017    BACK SURGERY      HERNIA REPAIR Left 2020    ROBOT ASSISTED LAPAROSCOPIC LEFT  INGUINAL HERNIA REPAIR WITH MESH performed by Patricio Garcia MD at [facility] OR    HERNIA REPAIR Right 2021    ROBOT ASSISTED LAPAROSCOPIC RIGHT INGUINAL HERNIA REPAIR WITH MESH performed by Patricio Garcia MD at [facility] OR    SHOULDER SURGERY Right      Family History   Problem Relation Age of Onset    Heart Disease Mother     High Blood Pressure Mother

## 2025-04-23 ENCOUNTER — OFFICE VISIT (OUTPATIENT)
Dept: FAMILY MEDICINE CLINIC | Age: 45
End: 2025-04-23

## 2025-04-23 VITALS
BODY MASS INDEX: 21.19 KG/M2 | HEART RATE: 102 BPM | WEIGHT: 148 LBS | HEIGHT: 70 IN | OXYGEN SATURATION: 99 % | DIASTOLIC BLOOD PRESSURE: 78 MMHG | SYSTOLIC BLOOD PRESSURE: 126 MMHG

## 2025-04-23 DIAGNOSIS — D75.89 MACROCYTOSIS ASSOCIATED WITH ALCOHOL: ICD-10-CM

## 2025-04-23 DIAGNOSIS — F10.90 CHRONIC ALCOHOL USE: ICD-10-CM

## 2025-04-23 DIAGNOSIS — Z72.0 CURRENT TOBACCO USE: ICD-10-CM

## 2025-04-23 DIAGNOSIS — E83.39 HYPOPHOSPHATEMIA: ICD-10-CM

## 2025-04-23 DIAGNOSIS — D75.1 POLYCYTHEMIA: ICD-10-CM

## 2025-04-23 DIAGNOSIS — E87.1 HYPONATREMIA: ICD-10-CM

## 2025-04-23 DIAGNOSIS — R42 DIZZINESS: ICD-10-CM

## 2025-04-23 DIAGNOSIS — R01.1 MURMUR, CARDIAC: Primary | ICD-10-CM

## 2025-04-23 DIAGNOSIS — E83.42 HYPOMAGNESEMIA: ICD-10-CM

## 2025-04-23 DIAGNOSIS — R74.8 ELEVATED LIVER ENZYMES: ICD-10-CM

## 2025-04-23 DIAGNOSIS — I10 ESSENTIAL HYPERTENSION: ICD-10-CM

## 2025-04-23 PROCEDURE — 3078F DIAST BP <80 MM HG: CPT

## 2025-04-23 PROCEDURE — 3074F SYST BP LT 130 MM HG: CPT

## 2025-04-23 PROCEDURE — 99213 OFFICE O/P EST LOW 20 MIN: CPT

## 2025-04-23 ASSESSMENT — ENCOUNTER SYMPTOMS
ABDOMINAL PAIN: 0
SHORTNESS OF BREATH: 0
VOMITING: 0
EYE PAIN: 0
PHOTOPHOBIA: 0

## 2025-04-23 NOTE — PROGRESS NOTES
Chief Complaint   Patient presents with    Follow-up     FOLLOW UP AFTER ER VISIT          ASSESSMENT/PLAN    Problem List           Diagnosed       Circulatory    Essential hypertension      Chronic, at goal (stable), Controlled with Lisinopril-HCTZ 20-25 mg daily. Continue current regimen for now; pending TTE for murmur and tachycardia. May need to start beta blocker depends on the TTE result.         Relevant Medications    lisinopril-hydroCHLOROthiazide (PRINZIDE;ZESTORETIC) 20-25 MG per tablet    Murmur, cardiac - Primary      New, uncertain prognosis, Obtain TTE.         Relevant Medications    lisinopril-hydroCHLOROthiazide (PRINZIDE;ZESTORETIC) 20-25 MG per tablet    Other Relevant Orders    Echo (TTE) complete (PRN contrast/bubble/strain/3D)    XR CHEST (2 VW)       Other    Current tobacco use      Chronic, not at goal (unstable), Has cut down to 1/2 pack daily; about to start Wellbutrin and Nicorette gum therapy.         Dizziness      Chronic, not at goal (unstable), Multiple contributing factors, including tobacco and alcohol use. Tachycardia may be secondary to possible cardiomyopathy associated with alcohol use and HTN. Obtain TTE. Rest plan per below.         Relevant Orders    Echo (TTE) complete (PRN contrast/bubble/strain/3D)    Comprehensive Metabolic Panel    Magnesium    Phosphorus    US ABDOMEN LIMITED    XR CHEST (2 VW)    Elevated liver enzymes      Chronic, worsening (exacerbation), Obtain liver ultrasound to rule out any hepatic pathology, especially alcoholic hepatitis.         Relevant Orders    Comprehensive Metabolic Panel    Magnesium    Phosphorus    US ABDOMEN LIMITED    XR CHEST (2 VW)    Chronic alcohol use      Chronic, not at goal (unstable), Currently weaning down, which I agree with the plan rather than cold turkey due to high risk of withdrawal symptoms. Advised patient to contact the office if patient experiences any withdrawal symptoms including mental status change,

## 2025-04-23 NOTE — ASSESSMENT & PLAN NOTE
Chronic, at goal (stable), Controlled with Lisinopril-HCTZ 20-25 mg daily. Continue current regimen for now; pending TTE for murmur and tachycardia. May need to start beta blocker depends on the TTE result.

## 2025-04-23 NOTE — ASSESSMENT & PLAN NOTE
Chronic, not at goal (unstable), Currently weaning down, which I agree with the plan rather than cold turkey due to high risk of withdrawal symptoms. Advised patient to contact the office if patient experiences any withdrawal symptoms including mental status change, tremors, agitation, or unbearable craving.

## 2025-04-23 NOTE — ASSESSMENT & PLAN NOTE
Chronic, not at goal (unstable), Multiple contributing factors, including tobacco and alcohol use. Tachycardia may be secondary to possible cardiomyopathy associated with alcohol use and HTN. Obtain TTE. Rest plan per below.

## 2025-04-23 NOTE — ASSESSMENT & PLAN NOTE
Chronic, worsening (exacerbation), Obtain liver ultrasound to rule out any hepatic pathology, especially alcoholic hepatitis.

## 2025-04-23 NOTE — ASSESSMENT & PLAN NOTE
Chronic, not at goal (unstable), Check CBC with peripheral smear, B12 and folate. Currently weaning down the alcohol consumption.

## 2025-05-01 ENCOUNTER — HOSPITAL ENCOUNTER (OUTPATIENT)
Dept: ULTRASOUND IMAGING | Age: 45
Discharge: HOME | End: 2025-05-01
Payer: COMMERCIAL

## 2025-05-01 DIAGNOSIS — R42 DIZZINESS: ICD-10-CM

## 2025-05-01 DIAGNOSIS — R74.8 ELEVATED LIVER ENZYMES: ICD-10-CM

## 2025-05-01 DIAGNOSIS — F10.90 CHRONIC ALCOHOL USE: ICD-10-CM

## 2025-05-01 PROCEDURE — 76705 ECHO EXAM OF ABDOMEN: CPT

## 2025-05-03 ENCOUNTER — RESULTS FOLLOW-UP (OUTPATIENT)
Dept: FAMILY MEDICINE CLINIC | Age: 45
End: 2025-05-03

## 2025-05-14 ENCOUNTER — HOSPITAL ENCOUNTER (OUTPATIENT)
Dept: VASCULAR LAB | Age: 45
Discharge: HOME | End: 2025-05-16
Payer: COMMERCIAL

## 2025-05-14 ENCOUNTER — HOSPITAL ENCOUNTER (OUTPATIENT)
Age: 45
Discharge: HOME | End: 2025-05-16
Payer: COMMERCIAL

## 2025-05-14 ENCOUNTER — HOSPITAL ENCOUNTER (OUTPATIENT)
Dept: GENERAL RADIOLOGY | Age: 45
Discharge: HOME | End: 2025-05-14
Payer: COMMERCIAL

## 2025-05-14 DIAGNOSIS — R74.8 ELEVATED LIVER ENZYMES: ICD-10-CM

## 2025-05-14 DIAGNOSIS — D75.1 POLYCYTHEMIA: ICD-10-CM

## 2025-05-14 DIAGNOSIS — E87.1 HYPONATREMIA: ICD-10-CM

## 2025-05-14 DIAGNOSIS — I1A.0 RESISTANT HYPERTENSION: ICD-10-CM

## 2025-05-14 DIAGNOSIS — R42 DIZZINESS: ICD-10-CM

## 2025-05-14 DIAGNOSIS — D75.89 MACROCYTOSIS ASSOCIATED WITH ALCOHOL: ICD-10-CM

## 2025-05-14 DIAGNOSIS — R01.1 MURMUR, CARDIAC: ICD-10-CM

## 2025-05-14 DIAGNOSIS — E83.39 HYPOPHOSPHATEMIA: ICD-10-CM

## 2025-05-14 DIAGNOSIS — E83.42 HYPOMAGNESEMIA: ICD-10-CM

## 2025-05-14 DIAGNOSIS — F10.90 CHRONIC ALCOHOL USE: ICD-10-CM

## 2025-05-14 LAB
ECHO AO ARCH DIAM: 2.2 CM
ECHO AO ROOT DIAM: 2.9 CM
ECHO AV AREA PEAK VELOCITY: 3 CM2
ECHO AV AREA VTI: 3.1 CM2
ECHO AV MEAN GRADIENT: 2 MMHG
ECHO AV MEAN VELOCITY: 0.7 M/S
ECHO AV PEAK GRADIENT: 4 MMHG
ECHO AV PEAK VELOCITY: 1 M/S
ECHO AV VELOCITY RATIO: 0.9
ECHO AV VTI: 13.9 CM
ECHO EST RA PRESSURE: 3 MMHG
ECHO IVC EXP: 1.3 CM
ECHO IVC INSP: 0.6 CM
ECHO LV E' LATERAL VELOCITY: 11.5 CM/S
ECHO LV E' SEPTAL VELOCITY: 7.62 CM/S
ECHO LV EDV A2C: 38 ML
ECHO LV EDV A4C: 51 ML
ECHO LV EF PHYSICIAN: 63 %
ECHO LV EJECTION FRACTION A2C: 62 %
ECHO LV EJECTION FRACTION A4C: 59 %
ECHO LV EJECTION FRACTION BIPLANE: 61 % (ref 55–100)
ECHO LV ESV A2C: 15 ML
ECHO LV ESV A4C: 21 ML
ECHO LV FRACTIONAL SHORTENING: 31 % (ref 28–44)
ECHO LV INTERNAL DIMENSION DIASTOLIC: 3.6 CM (ref 4.2–5.9)
ECHO LV INTERNAL DIMENSION SYSTOLIC: 2.5 CM
ECHO LV IVSD: 1 CM (ref 0.6–1)
ECHO LV MASS 2D: 100.2 G (ref 88–224)
ECHO LV POSTERIOR WALL DIASTOLIC: 0.9 CM (ref 0.6–1)
ECHO LV RELATIVE WALL THICKNESS RATIO: 0.5
ECHO LVOT AREA: 3.5 CM2
ECHO LVOT AV VTI INDEX: 0.91
ECHO LVOT DIAM: 2.1 CM
ECHO LVOT MEAN GRADIENT: 2 MMHG
ECHO LVOT PEAK GRADIENT: 3 MMHG
ECHO LVOT PEAK VELOCITY: 0.9 M/S
ECHO LVOT SV: 43.6 ML
ECHO LVOT VTI: 12.6 CM
ECHO MV A VELOCITY: 0.62 M/S
ECHO MV AREA VTI: 2.6 CM2
ECHO MV E DECELERATION TIME (DT): 217 MS
ECHO MV E VELOCITY: 0.51 M/S
ECHO MV E/A RATIO: 0.82
ECHO MV E/E' LATERAL: 4.43
ECHO MV E/E' RATIO (AVERAGED): 5.56
ECHO MV E/E' SEPTAL: 6.69
ECHO MV LVOT VTI INDEX: 1.34
ECHO MV MAX VELOCITY: 0.8 M/S
ECHO MV MEAN GRADIENT: 2 MMHG
ECHO MV MEAN VELOCITY: 0.6 M/S
ECHO MV PEAK GRADIENT: 2 MMHG
ECHO MV VTI: 16.9 CM
VAS AORTA DIST AP: 1.26 CM
VAS AORTA DIST TR: 1.5 CM
VAS AORTA MID PSV: 91 CM/S
VAS AORTA PROX AP: 1.6 CM
VAS AORTA PROX TR: 1.57 CM
VAS L RENAL ORIG RI: 0.72
VAS LEFT INTERLOBAR EDV: 10.1 CM/S
VAS LEFT INTERLOBAR PSV: 28.6 CM/S
VAS LEFT INTERLOBAR RI: 0.65
VAS LEFT KIDNEY LENGTH: 11.25 CM
VAS LEFT RENAL DIST EDV: 34.3 CM/S
VAS LEFT RENAL DIST PSV: 123 CM/S
VAS LEFT RENAL DIST RAR: 1.35
VAS LEFT RENAL DIST RI: 0.72
VAS LEFT RENAL MID EDV: 50.7 CM/S
VAS LEFT RENAL MID PSV: 221 CM/S
VAS LEFT RENAL MID RAR: 2.43
VAS LEFT RENAL MID RI: 0.77
VAS LEFT RENAL ORIGIN EDV: 81.1 CM/S
VAS LEFT RENAL ORIGIN PSV: 292 CM/S
VAS LEFT RENAL ORIGIN RAR: 3.21
VAS LEFT RENAL PROX EDV: 90.9 CM/S
VAS LEFT RENAL PROX PSV: 386 CM/S
VAS LEFT RENAL PROX RAR: 4.24
VAS LEFT RENAL PROX RI: 0.76
VAS LEFT RENAL RAR: 4.24
VAS RIGHT INTERLOBAR EDV: 16.6 CM/S
VAS RIGHT INTERLOBAR PSV: 35.6 CM/S
VAS RIGHT INTERLOBAR RI: 0.53
VAS RIGHT KIDNEY LENGTH: 11.92 CM
VAS RIGHT RENAL DIST EDV: 45.4 CM/S
VAS RIGHT RENAL DIST PSV: 155 CM/S
VAS RIGHT RENAL DIST RAR: 1.7
VAS RIGHT RENAL DIST RI: 0.71
VAS RIGHT RENAL MID EDV: 41.2 CM/S
VAS RIGHT RENAL MID PSV: 203 CM/S
VAS RIGHT RENAL MID RAR: 2.23
VAS RIGHT RENAL MID RI: 0.8
VAS RIGHT RENAL ORIGIN EDV: 111 CM/S
VAS RIGHT RENAL ORIGIN PSV: 415 CM/S
VAS RIGHT RENAL ORIGIN RAR: 4.56
VAS RIGHT RENAL ORIGIN RI: 0.73
VAS RIGHT RENAL PROX EDV: 90.3 CM/S
VAS RIGHT RENAL PROX PSV: 398 CM/S
VAS RIGHT RENAL PROX RAR: 4.37
VAS RIGHT RENAL PROX RI: 0.77
VAS RIGHT RENAL RAR: 4.56

## 2025-05-14 PROCEDURE — 71046 X-RAY EXAM CHEST 2 VIEWS: CPT

## 2025-05-14 PROCEDURE — 93306 TTE W/DOPPLER COMPLETE: CPT

## 2025-05-14 PROCEDURE — 93306 TTE W/DOPPLER COMPLETE: CPT | Performed by: INTERNAL MEDICINE

## 2025-05-14 PROCEDURE — 93975 VASCULAR STUDY: CPT

## 2025-05-14 PROCEDURE — 93975 VASCULAR STUDY: CPT | Performed by: SURGERY

## 2025-05-15 LAB
ALBUMIN SERPL-MCNC: 4.8 G/DL (ref 3.4–5)
ALBUMIN/GLOB SERPL: 1.8 {RATIO} (ref 1.1–2.2)
ALP SERPL-CCNC: 142 U/L (ref 40–129)
ALT SERPL-CCNC: 128 U/L (ref 10–40)
ANION GAP SERPL CALCULATED.3IONS-SCNC: 14 MMOL/L (ref 3–16)
AST SERPL-CCNC: 126 U/L (ref 15–37)
BASOPHILS # BLD: 0.1 K/UL (ref 0–0.2)
BASOPHILS NFR BLD: 0.8 %
BILIRUB SERPL-MCNC: 0.6 MG/DL (ref 0–1)
BUN SERPL-MCNC: 7 MG/DL (ref 7–20)
CALCIUM SERPL-MCNC: 9.8 MG/DL (ref 8.3–10.6)
CHLORIDE SERPL-SCNC: 86 MMOL/L (ref 99–110)
CO2 SERPL-SCNC: 23 MMOL/L (ref 21–32)
CREAT SERPL-MCNC: 0.8 MG/DL (ref 0.9–1.3)
DEPRECATED RDW RBC AUTO: 13.6 % (ref 12.4–15.4)
EOSINOPHIL # BLD: 0.4 K/UL (ref 0–0.6)
EOSINOPHIL NFR BLD: 2.8 %
FOLATE SERPL-MCNC: 11.7 NG/ML (ref 4.78–24.2)
GFR SERPLBLD CREATININE-BSD FMLA CKD-EPI: >90 ML/MIN/{1.73_M2}
GLUCOSE SERPL-MCNC: 73 MG/DL (ref 70–99)
HCT VFR BLD AUTO: 48.1 % (ref 40.5–52.5)
HGB BLD-MCNC: 17.1 G/DL (ref 13.5–17.5)
LYMPHOCYTES # BLD: 2.7 K/UL (ref 1–5.1)
LYMPHOCYTES NFR BLD: 21.4 %
MAGNESIUM SERPL-MCNC: 2.01 MG/DL (ref 1.8–2.4)
MCH RBC QN AUTO: 36.3 PG (ref 26–34)
MCHC RBC AUTO-ENTMCNC: 35.5 G/DL (ref 31–36)
MCV RBC AUTO: 102.1 FL (ref 80–100)
MONOCYTES # BLD: 0.8 K/UL (ref 0–1.3)
MONOCYTES NFR BLD: 6 %
NEUTROPHILS # BLD: 8.7 K/UL (ref 1.7–7.7)
NEUTROPHILS NFR BLD: 69 %
PATH INTERP BLD-IMP: NORMAL
PATH INTERP BLD-IMP: NORMAL
PHOSPHATE SERPL-MCNC: 4.2 MG/DL (ref 2.5–4.9)
PLATELET # BLD AUTO: 198 K/UL (ref 135–450)
PMV BLD AUTO: 9.5 FL (ref 5–10.5)
POTASSIUM SERPL-SCNC: 4.9 MMOL/L (ref 3.5–5.1)
PROT SERPL-MCNC: 7.4 G/DL (ref 6.4–8.2)
RBC # BLD AUTO: 4.71 M/UL (ref 4.2–5.9)
SODIUM SERPL-SCNC: 123 MMOL/L (ref 136–145)
VIT B12 SERPL-MCNC: 798 PG/ML (ref 211–911)
WBC # BLD AUTO: 12.6 K/UL (ref 4–11)

## 2025-05-16 ENCOUNTER — TELEPHONE (OUTPATIENT)
Dept: FAMILY MEDICINE CLINIC | Age: 45
End: 2025-05-16

## 2025-05-16 ENCOUNTER — RESULTS FOLLOW-UP (OUTPATIENT)
Dept: FAMILY MEDICINE CLINIC | Age: 45
End: 2025-05-16

## 2025-05-16 DIAGNOSIS — I1A.0 RESISTANT HYPERTENSION: ICD-10-CM

## 2025-05-16 RX ORDER — M-VIT,TX,IRON,MINS/CALC/FOLIC 27MG-0.4MG
1 TABLET ORAL DAILY
Qty: 30 TABLET | Refills: 2 | Status: SHIPPED | OUTPATIENT
Start: 2025-05-16 | End: 2025-08-14

## 2025-05-16 RX ORDER — LISINOPRIL AND HYDROCHLOROTHIAZIDE 20; 25 MG/1; MG/1
1 TABLET ORAL DAILY
Qty: 30 TABLET | Refills: 1 | Status: SHIPPED | OUTPATIENT
Start: 2025-05-16

## 2025-05-16 RX ORDER — BUPROPION HYDROCHLORIDE 150 MG/1
150 TABLET ORAL EVERY MORNING
Qty: 30 TABLET | Refills: 1 | Status: SHIPPED | OUTPATIENT
Start: 2025-05-16 | End: 2025-06-15

## 2025-05-16 NOTE — TELEPHONE ENCOUNTER
Called and informed pt. Pt would like multivitamin send to pharmacy. Pended to correct pharmacy. Can close this after sending. MA

## 2025-05-16 NOTE — TELEPHONE ENCOUNTER
Patient needs a refill on his medication LISINOPRIL 20-25 MG PER TABLET - 1 TABLET PER DAY - 30 DAY SUPPLY.    Wellbutrin  mg extended release tablet - 1 tablet per day - 30 day supply    [facility] - [address] - phone no. [phone]    Please give him a call back.

## 2025-05-30 ENCOUNTER — INITIAL CONSULT (OUTPATIENT)
Dept: VASCULAR SURGERY | Age: 45
End: 2025-05-30

## 2025-05-30 VITALS
HEART RATE: 96 BPM | WEIGHT: 144.3 LBS | HEIGHT: 70 IN | BODY MASS INDEX: 20.66 KG/M2 | DIASTOLIC BLOOD PRESSURE: 72 MMHG | SYSTOLIC BLOOD PRESSURE: 104 MMHG

## 2025-05-30 DIAGNOSIS — I70.1 RENAL ARTERY STENOSIS, NATIVE, BILATERAL: ICD-10-CM

## 2025-05-30 DIAGNOSIS — M79.604 LEG PAIN, BILATERAL: ICD-10-CM

## 2025-05-30 DIAGNOSIS — R29.818 NEUROGENIC CLAUDICATION: ICD-10-CM

## 2025-05-30 DIAGNOSIS — I1A.0 RESISTANT HYPERTENSION: Primary | ICD-10-CM

## 2025-05-30 DIAGNOSIS — F10.11 HISTORY OF ETOH ABUSE: ICD-10-CM

## 2025-05-30 DIAGNOSIS — M79.605 LEG PAIN, BILATERAL: ICD-10-CM

## 2025-05-30 DIAGNOSIS — L08.9 INFECTED SEBACEOUS CYST OF SKIN: ICD-10-CM

## 2025-05-30 DIAGNOSIS — L72.3 INFECTED SEBACEOUS CYST OF SKIN: ICD-10-CM

## 2025-05-30 ASSESSMENT — ENCOUNTER SYMPTOMS
ALLERGIC/IMMUNOLOGIC NEGATIVE: 1
EYES NEGATIVE: 1
RESPIRATORY NEGATIVE: 1
COLOR CHANGE: 1
BACK PAIN: 1
GASTROINTESTINAL NEGATIVE: 1

## 2025-05-30 NOTE — PATIENT INSTRUCTIONS
Please call [phone] to schedule a CTA of the abdomen and pelvis.  After this is done we will call you about results and any further steps.     Please schedule this for sometime in the next two weeks.

## 2025-05-30 NOTE — PROGRESS NOTES
Relevant Orders    CTA ABDOMEN PELVIS W WO CONTRAST       Musculoskeletal and Integument    Infected sebaceous cyst of skin       Other    Neurogenic claudication    Suspected previous back surgery with rods for blood-borne infection in the back 12 years ago          Leg pain, bilateral    Both anterior thighs with walking has to stop and rest          History of ETOH abuse       PLAN:  Renovascular hypertension ;patient has significant RICK elevated velocities in both renal arteries right slightly higher than left about 400 range, reviewed [place] [facility] [place] I do not see a lot of calcified plaque, so fibromuscular dysplasia is a possibility.  Does not know much about family history father is long gone mother is alive but very sickly and does have renal failure.  Will get CTA to evaluate the renal artery stenoses discussed with him about possible treatment with balloons and stents etc. spoke to Dr. Devries who would do the intervention and he agrees with the plan.  Recommend he continue his lisinopril hydrochlorothiazide although a drop in blood pressure may be causing his dizzy feelings    I think he may have neurogenic claudication with relatively severe pain in the upper thighs bilateral with walking.  But I think his pulses and arterial exam are relatively normal and would not explain this pain.  His back surgery was from drug abuse with bacteria settling in his back and needing antibiotics long-term and surgery about 12 years ago this was done up in [place].    His wife said he complains of cold feet and bluish discolored feet he has a few varicose veins I explained that cold feet would be the least reliable symptom of of bad arteries and that his arteries are good.  Purple color believe is from dependency he does not have any swelling or complaints of swelling at the end of the day do not think he has venous insufficiency either    He does have a history of infected cyst on his buttock he

## 2025-06-11 ENCOUNTER — HOSPITAL ENCOUNTER (OUTPATIENT)
Dept: CT IMAGING | Age: 45
Discharge: HOME | End: 2025-06-11
Attending: SURGERY
Payer: COMMERCIAL

## 2025-06-11 DIAGNOSIS — I70.1 RENAL ARTERY STENOSIS, NATIVE, BILATERAL: ICD-10-CM

## 2025-06-11 DIAGNOSIS — I1A.0 RESISTANT HYPERTENSION: ICD-10-CM

## 2025-06-11 PROCEDURE — 74174 CTA ABD&PLVS W/CONTRAST: CPT

## 2025-06-11 PROCEDURE — 6360000004 HC RX CONTRAST MEDICATION: Performed by: SURGERY

## 2025-06-11 RX ORDER — IOPAMIDOL 755 MG/ML
75 INJECTION, SOLUTION INTRAVASCULAR
Status: COMPLETED | OUTPATIENT
Start: 2025-06-11 | End: 2025-06-11

## 2025-06-11 RX ADMIN — IOPAMIDOL 75 ML: 755 INJECTION, SOLUTION INTRAVENOUS at 09:07

## 2025-06-16 ENCOUNTER — TELEPHONE (OUTPATIENT)
Dept: HYPERBARIC MEDICINE | Age: 45
End: 2025-06-16

## 2025-06-16 NOTE — TELEPHONE ENCOUNTER
Will have Dr. Velazquez review the films along with his symptoms which are claudication like on the right thigh.(I thought this was neurogenic claudication) so he may need an right external iliac balloon and stent

## 2025-06-27 ENCOUNTER — OFFICE VISIT (OUTPATIENT)
Dept: VASCULAR SURGERY | Age: 45
End: 2025-06-27
Payer: COMMERCIAL

## 2025-06-27 VITALS — HEIGHT: 70 IN | WEIGHT: 144 LBS | BODY MASS INDEX: 20.62 KG/M2

## 2025-06-27 DIAGNOSIS — M79.605 LEG PAIN, BILATERAL: ICD-10-CM

## 2025-06-27 DIAGNOSIS — M79.604 LEG PAIN, BILATERAL: ICD-10-CM

## 2025-06-27 DIAGNOSIS — I70.213 ATHEROSCLEROSIS OF NATIVE ARTERIES OF EXTREMITIES WITH INTERMITTENT CLAUDICATION, BILATERAL LEGS: Primary | ICD-10-CM

## 2025-06-27 DIAGNOSIS — I70.1 RENAL ARTERY STENOSIS, NATIVE, BILATERAL: ICD-10-CM

## 2025-06-27 PROCEDURE — 99214 OFFICE O/P EST MOD 30 MIN: CPT | Performed by: SURGERY

## 2025-06-27 ASSESSMENT — ENCOUNTER SYMPTOMS
GASTROINTESTINAL NEGATIVE: 1
ALLERGIC/IMMUNOLOGIC NEGATIVE: 1
RESPIRATORY NEGATIVE: 1
EYES NEGATIVE: 1

## 2025-06-27 NOTE — PROGRESS NOTES
Johnny Flores (:  1980) is a 44 y.o. male, here for evaluation of the following chief complaint(s):  New Patient (Ref by Dr. Garcia, BLE leg pain, can not walk a lot without having to stop)      Subjective   SUBJECTIVE/OBJECTIVE:  HPI  Mr. Flores is a 44-year-old male presenting for evaluation after CTA of the abdomen and pelvis for evaluation of renal artery stenosis.  Patient was previously seen by Dr. Garcia for concern for renal artery stenosis due to uncontrolled hypertension and duplex concerning for possible FMD.  CTA results noted below showed no significant renal artery stenosis and no findings consistent with FMD.  Patient states his blood pressure is better controlled now.  He is complaining of bilateral, right worse than left thigh pain.  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  Patient notes consistent tiredness in his right thigh with subsequent symptoms in the left.  Patient is a prior 2 pack-a-day smoker though has decreased this to approximately half pack a day.  Patient notes multiple recent episodes where he had to stop and rest due to the symptoms in his thigh.    Review of Systems   Constitutional: Negative.    HENT: Negative.     Eyes: Negative.    Respiratory: Negative.     Cardiovascular: Negative.    Gastrointestinal: Negative.    Endocrine: Negative.    Genitourinary: Negative.    Musculoskeletal: Negative.    Skin: Negative.    Allergic/Immunologic: Negative.    Neurological: Negative.    Hematological: Negative.    Psychiatric/Behavioral: Negative.            Objective   Physical Exam  Vitals and nursing note reviewed.   Constitutional:       General: He is not in acute distress.     Appearance: Normal appearance.   HENT:      Head: Normocephalic and atraumatic.   Cardiovascular:      Rate and Rhythm: Regular rhythm. Tachycardia present.   Pulmonary:      Effort: Pulmonary effort is normal.   Abdominal:      General: There is no distension.      Palpations:

## 2025-07-03 ENCOUNTER — HOSPITAL ENCOUNTER (OUTPATIENT)
Dept: VASCULAR LAB | Age: 45
Discharge: HOME | End: 2025-07-05
Payer: COMMERCIAL

## 2025-07-03 DIAGNOSIS — M79.605 LEG PAIN, BILATERAL: ICD-10-CM

## 2025-07-03 DIAGNOSIS — M79.604 LEG PAIN, BILATERAL: ICD-10-CM

## 2025-07-03 DIAGNOSIS — I70.213 ATHEROSCLEROSIS OF NATIVE ARTERIES OF EXTREMITIES WITH INTERMITTENT CLAUDICATION, BILATERAL LEGS: ICD-10-CM

## 2025-07-03 PROCEDURE — 93925 LOWER EXTREMITY STUDY: CPT

## 2025-07-06 LAB
VAS LEFT ABI: 0.9
VAS LEFT ARM BP: 102 MMHG
VAS LEFT ATA DIST PSV: 34.5 CM/S
VAS LEFT CFA DIST PSV: 109 CM/S
VAS LEFT CFA PROX PSV: 81.3 CM/S
VAS LEFT DORSALIS PEDIS BP: 92 MMHG
VAS LEFT PERONEAL MID PSV: 29.5 CM/S
VAS LEFT PFA PROX PSV: 56 CM/S
VAS LEFT POP A DIST PSV: 57.5 CM/S
VAS LEFT POP A PROX PSV: 39.8 CM/S
VAS LEFT POP A PROX VEL RATIO: 0.59
VAS LEFT PTA BP: 94 MMHG
VAS LEFT PTA DIST PSV: 28.4 CM/S
VAS LEFT PTA MID PSV: 37.1 CM/S
VAS LEFT SFA DIST PSV: 67.8 CM/S
VAS LEFT SFA DIST VEL RATIO: 0.71
VAS LEFT SFA MID PSV: 94.9 CM/S
VAS LEFT SFA MID VEL RATIO: 0.96
VAS LEFT SFA PROX PSV: 99.3 CM/S
VAS LEFT SFA PROX VEL RATIO: 0.91
VAS RIGHT ABI: 0.9
VAS RIGHT ARM BP: 104 MMHG
VAS RIGHT ATA DIST PSV: 26.6 CM/S
VAS RIGHT CFA DIST PSV: 65.5 CM/S
VAS RIGHT CFA PROX PSV: 90.8 CM/S
VAS RIGHT DORSALIS PEDIS BP: 90 MMHG
VAS RIGHT PERONEAL MID PSV: 29.5 CM/S
VAS RIGHT PFA PROX PSV: 122 CM/S
VAS RIGHT POP A DIST PSV: 50.8 CM/S
VAS RIGHT POP A PROX PSV: 31.2 CM/S
VAS RIGHT POP A PROX VEL RATIO: 0.67
VAS RIGHT PTA BP: 94 MMHG
VAS RIGHT PTA DIST PSV: 27 CM/S
VAS RIGHT PTA MID PSV: 31.9 CM/S
VAS RIGHT SFA DIST PSV: 46.4 CM/S
VAS RIGHT SFA DIST VEL RATIO: 0.6
VAS RIGHT SFA MID PSV: 76.9 CM/S
VAS RIGHT SFA MID VEL RATIO: 0.7
VAS RIGHT SFA PROX PSV: 105 CM/S
VAS RIGHT SFA PROX VEL RATIO: 1.2

## 2025-07-12 DIAGNOSIS — I1A.0 RESISTANT HYPERTENSION: ICD-10-CM

## 2025-07-14 RX ORDER — LISINOPRIL AND HYDROCHLOROTHIAZIDE 20; 25 MG/1; MG/1
1 TABLET ORAL DAILY
Qty: 30 TABLET | Refills: 1 | Status: SHIPPED | OUTPATIENT
Start: 2025-07-14

## (undated) DEVICE — ELECTRODE PT RET AD L9FT HI MOIST COND ADH HYDRGEL CORDED

## (undated) DEVICE — APPLICATOR MEDICATED 26 CC SOLUTION HI LT ORNG CHLORAPREP

## (undated) DEVICE — 3M™ IOBAN™ 2 ANTIMICROBIAL INCISE DRAPE 6650EZ: Brand: IOBAN™ 2

## (undated) DEVICE — BLADELESS OBTURATOR: Brand: WECK VISTA

## (undated) DEVICE — CATHETER TRAY 16 FR 5 CC FOL ANTIREFLX SAMPLING PRT DOVER

## (undated) DEVICE — TUBING INSUFFLATION 10FT HIGH FLOW LEUR

## (undated) DEVICE — PAD TABLE RAMPED 1 IN TO 2 IN TRENDELENBURG

## (undated) DEVICE — 30977 SEE SHARP - ENHANCED INTRAOPERATIVE LAPAROSCOPE CLEANING & DEFOGGING: Brand: 30977 SEE SHARP - ENHANCED INTRAOPERATIVE LAPAROSCOPE CLEANING & DEFOGGING

## (undated) DEVICE — INSUFFLATION NEEDLE TO ESTABLISH PNEUMOPERITONEUM.: Brand: INSUFFLATION NEEDLE

## (undated) DEVICE — MERCY FAIRFIELD TURNOVER KIT: Brand: MEDLINE INDUSTRIES, INC.

## (undated) DEVICE — CANNULA SEAL

## (undated) DEVICE — SUTURE VCRL + SZ 4-0 L18IN ABSRB UD L19MM PS-2 3/8 CIR PRIM VCP496H

## (undated) DEVICE — STERILE POLYISOPRENE POWDER-FREE SURGICAL GLOVES: Brand: PROTEXIS

## (undated) DEVICE — ADHESIVE SKIN CLSR 0.7ML TOP DERMBND ADV

## (undated) DEVICE — TIP COVER ACCESSORY

## (undated) DEVICE — BLADE CLIPPER GEN PURP NS

## (undated) DEVICE — BLANKET WRM W29.9XL79.1IN UP BODY FORC AIR MISTRAL-AIR

## (undated) DEVICE — CHLORAPREP 26ML ORANGE

## (undated) DEVICE — GOWN,AURORA,NONREINF,RAGLAN,XXL,STERILE: Brand: MEDLINE

## (undated) DEVICE — TOTAL TRAY, DB, 100% SILI FOLEY, 16FR 10: Brand: MEDLINE

## (undated) DEVICE — SUTURE V-LOC 180 SZ 2-0 L6IN ABSRB GRN GS-22 L27MM 1/2 CIR VLOCL2105

## (undated) DEVICE — ARM DRAPE

## (undated) DEVICE — ROBOTIC PK

## (undated) DEVICE — SUTURE VCRL SZ 4-0 L18IN ABSRB UD L19MM PS-2 3/8 CIR PRIM J496H

## (undated) DEVICE — PAD TBL OP RM TRENDELENBURG STATIC TORSO W/STRAPS